# Patient Record
Sex: FEMALE | Race: WHITE | Employment: FULL TIME | URBAN - METROPOLITAN AREA
[De-identification: names, ages, dates, MRNs, and addresses within clinical notes are randomized per-mention and may not be internally consistent; named-entity substitution may affect disease eponyms.]

---

## 2017-05-16 ENCOUNTER — DOCUMENTATION ONLY (OUTPATIENT)
Dept: FAMILY MEDICINE CLINIC | Age: 57
End: 2017-05-16

## 2017-05-16 NOTE — PROGRESS NOTES
HISTORY OF PRESENT ILLNESS  Georgia Brizuela is a 55 y.o. female. HPI    ROS    Physical Exam    ASSESSMENT and PLAN       Message left on cell phone-Last physical was 6/30/16. Patient ask to check with her insurance to be sure that a physical on 5/17/17 will be covered. Ask to reschedule if the insurance will not cover physical on 5/17/17.

## 2017-05-17 ENCOUNTER — OFFICE VISIT (OUTPATIENT)
Dept: FAMILY MEDICINE CLINIC | Age: 57
End: 2017-05-17

## 2017-05-17 VITALS
RESPIRATION RATE: 16 BRPM | DIASTOLIC BLOOD PRESSURE: 78 MMHG | HEART RATE: 88 BPM | TEMPERATURE: 96.7 F | OXYGEN SATURATION: 98 % | BODY MASS INDEX: 27.31 KG/M2 | SYSTOLIC BLOOD PRESSURE: 119 MMHG | HEIGHT: 66 IN | WEIGHT: 169.9 LBS

## 2017-05-17 DIAGNOSIS — Z00.00 PHYSICAL EXAM: Primary | ICD-10-CM

## 2017-05-17 DIAGNOSIS — K59.00 CONSTIPATION, UNSPECIFIED CONSTIPATION TYPE: ICD-10-CM

## 2017-05-17 DIAGNOSIS — E78.00 PURE HYPERCHOLESTEROLEMIA: ICD-10-CM

## 2017-05-17 DIAGNOSIS — K57.30 DIVERTICULOSIS OF LARGE INTESTINE WITHOUT HEMORRHAGE: ICD-10-CM

## 2017-05-17 DIAGNOSIS — R73.09 INCREASED GLUCOSE LEVEL: ICD-10-CM

## 2017-05-17 DIAGNOSIS — Z00.00 LABORATORY EXAM ORDERED AS PART OF ROUTINE GENERAL MEDICAL EXAMINATION: ICD-10-CM

## 2017-05-17 DIAGNOSIS — B00.9 RECURRENT HSV (HERPES SIMPLEX VIRUS): ICD-10-CM

## 2017-05-17 RX ORDER — VALACYCLOVIR HYDROCHLORIDE 1 G/1
TABLET, FILM COATED ORAL
Qty: 10 TAB | Refills: 1 | Status: SHIPPED | OUTPATIENT
Start: 2017-05-17 | End: 2019-11-20

## 2017-05-17 NOTE — PROGRESS NOTES
Chief Complaint   Patient presents with    Complete Physical    Labs     Fasting     1. Have you been to the ER, urgent care clinic since your last visit? Hospitalized since your last visit? No    2. Have you seen or consulted any other health care providers outside of the [address] [place] [address] since your last visit? Include any pap smears or colon screening. No     I have reviewed Health Maintenance with the patient and updated. Advance Care Planning information reviewed and given to the patient. The patient was counseled on the dangers of tobacco use, and Patient is a non smoker. Reviewed strategies to maximize success, including Continue not to smoke. Complete Physical Exam Female  Pre-Visit Questions:    1. Do you follow a low fat or low salt diet ?n   2. Do you follow an exercise program? y  3. Have you had your tetanus booster in the last 10 years? y  3. Have you ever had a Pneumonia vaccine? n  5. Do you smoke? n  6. Do you consider yourself overweight? y  7. Do you perform Breast self exam?y  8. Is there a family history of CAD< age 48? n  5. Is there a family history of Cancer? y  8. Do you have any Cancer risks?n   11. Have you had a colonoscopy? y  15. Have you been to your eye doctor past year?   y  15. Have you been to your dentist in the last 6 months?  y  15. Have you had your flu shot for this season?  y  13. Have you had a Pap smear in the last 3 years?y  16. Have you had your annual mammogram?y  17.   Have you had a bone density scan(DEXA)?y

## 2017-05-17 NOTE — LETTER
5/19/2017 2:09 PM 
 
Ms. Georgia Brizuela 
[id number] [facility] [address] [address] Dear Brianna Archuleta: 
 
Please find your most recent results below. Resulted Orders VITAMIN D, 25 HYDROXY Result Value Ref Range VITAMIN D, 25-HYDROXY 41.5 30.0 - 100.0 ng/mL Comment:  
   Vitamin D deficiency has been defined by the [address] [place] [address] practice guideline as a 
level of serum 25-OH vitamin D less than 20 ng/mL (1,2). The Endocrine Society went on to further define vitamin D 
insufficiency as a level between 21 and 29 ng/mL (2). 1. IOM ([place] of [employer]). 2010. Dietary reference 
   intakes for calcium and D. [facility]: The 
   hive01. 2. Magdy MF, Nasima TERRAZAS, Zaid THORNE, et al. 
   Evaluation, treatment, and prevention of vitamin D 
   deficiency: an Endocrine Society clinical practice 
   guideline. JCEM. 2011 Jul; 96(7):1911-30. Narrative Performed at:  [address]  [id number] : Mehran Marte MD, Phone:  [phone] URINALYSIS W/ RFLX MICROSCOPIC Result Value Ref Range Specific Gravity 1.027 1.005 - 1.030  
 pH (UA) 7.0 5.0 - 7.5 Color Yellow Yellow Appearance Clear Clear Leukocyte Esterase Negative Negative Protein Negative Negative/Trace Glucose Negative Negative Ketone Negative Negative Blood Negative Negative Bilirubin Negative Negative Urobilinogen 0.2 0.2 - 1.0 mg/dL Nitrites Negative Negative Microscopic Examination Comment Comment:  
   Microscopic not indicated and not performed. Narrative Performed at:  [address]  [id number] : Mehran Marte MD, Phone:  [phone] TSH 3RD GENERATION Result Value Ref Range TSH 0.760 0.450 - 4.500 uIU/mL Narrative Performed at:  [address]  [id number] : Yuliya Adame MD, Phone:  [phone] METABOLIC PANEL, COMPREHENSIVE Result Value Ref Range Glucose 100 (H) 65 - 99 mg/dL BUN 14 6 - 24 mg/dL Creatinine 0.88 0.57 - 1.00 mg/dL GFR est non-AA 74 >59 mL/min/1.73 GFR est AA 86 >59 mL/min/1.73  
 BUN/Creatinine ratio 16 9 - 23 Sodium 142 134 - 144 mmol/L Potassium 4.7 3.5 - 5.2 mmol/L Chloride 103 96 - 106 mmol/L  
 CO2 25 18 - 29 mmol/L Calcium 9.5 8.7 - 10.2 mg/dL Protein, total 7.0 6.0 - 8.5 g/dL Albumin 4.5 3.5 - 5.5 g/dL GLOBULIN, TOTAL 2.5 1.5 - 4.5 g/dL A-G Ratio 1.8 1.2 - 2.2 Bilirubin, total 0.4 0.0 - 1.2 mg/dL Alk. phosphatase 67 39 - 117 IU/L  
 AST (SGOT) 21 0 - 40 IU/L  
 ALT (SGPT) 20 0 - 32 IU/L Narrative Performed at:  [address]  [id number] : Yuliya Adame MD, Phone:  [phone] LIPID PANEL Result Value Ref Range Cholesterol, total 270 (H) 100 - 199 mg/dL Triglyceride 137 0 - 149 mg/dL HDL Cholesterol 77 >39 mg/dL VLDL, calculated 27 5 - 40 mg/dL LDL, calculated 166 (H) 0 - 99 mg/dL Narrative Performed at:  [address]  [id number] : Yuliya Adame MD, Phone:  [phone] CBC WITH AUTOMATED DIFF Result Value Ref Range WBC 3.9 3.4 - 10.8 x10E3/uL  
 RBC 4.58 3.77 - 5.28 x10E6/uL HGB 13.6 11.1 - 15.9 g/dL HCT 41.4 34.0 - 46.6 % MCV 90 79 - 97 fL  
 MCH 29.7 26.6 - 33.0 pg  
 MCHC 32.9 31.5 - 35.7 g/dL  
 RDW 13.0 12.3 - 15.4 % PLATELET 419 695 - 341 x10E3/uL NEUTROPHILS 52 % Lymphocytes 34 % MONOCYTES 8 % EOSINOPHILS 5 % BASOPHILS 1 %  
 ABS. NEUTROPHILS 2.1 1.4 - 7.0 x10E3/uL Abs Lymphocytes 1.3 0.7 - 3.1 x10E3/uL  
 ABS. MONOCYTES 0.3 0.1 - 0.9 x10E3/uL  
 ABS. EOSINOPHILS 0.2 0.0 - 0.4 x10E3/uL  
 ABS. BASOPHILS 0.0 0.0 - 0.2 x10E3/uL IMMATURE GRANULOCYTES 0 % ABS. IMM. GRANS. 0.0 0.0 - 0.1 x10E3/uL Narrative Performed at:  [address]  [id number] : Susanna Alvarez MD, Phone:  [phone] CVD REPORT Result Value Ref Range INTERPRETATION Note Comment:  
   Supplement report is available. Narrative Performed at:  [address] [place] [address]  [id number] : Mary Max PhD, Phone:  [phone] Mert Douglas MD

## 2017-05-17 NOTE — PROGRESS NOTES
HISTORY OF PRESENT ILLNESS  Georgia Brizuela is a 55 y.o. female. HPI   Here for [facility]. Notes 10# weight gain. No regular exercise. Not watching diet. Eye doctor past yr. Dentist 2 x annually. Colonoscopy '11.  10 yr repeat. Gyn and mammo past yr. DEXA > 10 yrs ago. Derm annually. Scalp itching and rash. Given cream with benefit. Patient Active Problem List   Diagnosis Code    Estrogen deficiency E28.39    Pure hypercholesterolemia E78.00    Insomnia due to stress F51.02    Stress F43.9    Diverticulosis of colon K57.30    Constipation K59.00    Recurrent HSV (herpes simplex virus) B00.9     Current Outpatient Prescriptions   Medication Sig Dispense Refill    traZODone (DESYREL) 100 mg tablet take 1 to 1 and 1/2 tablets by mouth nightly at bedtime 135 Tab 2    valACYclovir (VALTREX) 1 g tablet TAKE 1/2-1 TABLET BY MOUTH TWICE DAILY FOR 3 DAYS 10 Tab 1    conjugated estrogens (PREMARIN) 0.3 mg tablet Take 0.3 mg by mouth daily.        Allergies   Allergen Reactions    Codeine Nausea and Vomiting    Pcn [Penicillins] Hives    Sulfa (Sulfonamide Antibiotics) Hives     Past Medical History:   Diagnosis Date    Atypical chest pain 1/7/10    Constipation, chronic                chronic issue but 7/10/15 [facility] PF    Contact dermatitis and other eczema, due to unspecified cause     Diverticula of colon     Estrogen deficiency 2009    H/O cold sores     Hypercholesterolemia     Insomnia     Nausea & vomiting     Other pyelonephritis or pyonephrosis, not specified as acute or chronic 1980    Pharyngitis 10/4/15    [facility]    Psychiatric disorder     anxiety    Radius fracture 2013    left wrist @ [facility] then to TOSS     Past Surgical History:   Procedure Laterality Date    HX  SECTION      X1    HX DILATION AND CURETTAGE      X2    HX GYN  06    RICHARD BSO    HX HYSTERECTOMY      HX ORTHOPAEDIC  05    L knee ant cruc repair    HX OTHER SURGICAL exploratory lap    I&D ABCESS COMP/MULTIPLE  5/23/11 [facility] er    SC COLONOSCOPY FLX DX W/COLLJ SPEC WHEN PFRMD  4/1/2011          Family History   Problem Relation Age of Onset    Hypertension Mother     Thyroid Disease Mother     Heart Disease Maternal Grandmother     Diabetes Maternal Grandmother     Hypertension Brother      Social History   Substance Use Topics    Smoking status: Never Smoker    Smokeless tobacco: Never Used    Alcohol use 0.0 oz/week      Comment: 3 drinks/week      Lab Results  Component Value Date/Time   WBC 4.6 06/17/2016 08:31 AM   HGB 13.3 06/17/2016 08:31 AM   HCT 39.4 06/17/2016 08:31 AM   PLATELET 454 13/97/0093 08:31 AM   MCV 89 06/17/2016 08:31 AM       Lab Results  Component Value Date/Time   Glucose 92 06/17/2016 08:31 AM   LDL, calculated 98 06/17/2016 08:31 AM   Creatinine 0.84 06/17/2016 08:31 AM      Lab Results  Component Value Date/Time   Cholesterol, total 213 06/17/2016 08:31 AM   HDL Cholesterol 70 06/17/2016 08:31 AM   LDL, calculated 98 06/17/2016 08:31 AM   Triglyceride 223 06/17/2016 08:31 AM   CHOL/HDL Ratio 3.0 09/03/2009 08:48 AM       Lab Results  Component Value Date/Time   ALT (SGPT) 20 06/17/2016 08:31 AM   AST (SGOT) 28 06/17/2016 08:31 AM   Alk. phosphatase 56 06/17/2016 08:31 AM   Bilirubin, total 0.4 06/17/2016 08:31 AM       Lab Results  Component Value Date/Time   GFR est AA 91 06/17/2016 08:31 AM   GFR est non-AA 79 06/17/2016 08:31 AM   Creatinine 0.84 06/17/2016 08:31 AM   BUN 14 06/17/2016 08:31 AM   Sodium 143 06/17/2016 08:31 AM   Potassium 4.6 06/17/2016 08:31 AM   Chloride 103 06/17/2016 08:31 AM   CO2 28 06/17/2016 08:31 AM      Lab Results  Component Value Date/Time   TSH 1.160 06/17/2016 08:31 AM      Lab Results   Component Value Date/Time    Glucose 92 06/17/2016 08:31 AM       Fasting for labs  Review of Systems   Constitutional: Negative. Eyes: Negative. Respiratory: Negative. Cardiovascular: Negative. Gastrointestinal: Positive for constipation. Genitourinary: Negative. Musculoskeletal: Positive for joint pain. Skin: Positive for itching. Neurological: Positive for headaches. Endo/Heme/Allergies: Positive for environmental allergies. Psychiatric/Behavioral: Negative. Physical Exam   Vitals:    05/17/17 0920   BP: 119/78   Pulse: 88   Resp: 16   Temp: 96.7 °F (35.9 °C)   TempSrc: Oral   SpO2: 98%   Weight: 169 lb 14.4 oz (77.1 kg)   Height: 5' 5.5\" (1.664 m)       General    alert, cooperative, no distress, appears stated age   Head    Normocephalic, without obvious abnormality, atraumatic   Eyes    conjunctivae/corneas clear. PERRL. Fundi benign   Ears    Canals and TMs clear   Nose Passages patent. Mucosa normal. No drainage or sinus tenderness. Throat Lips, mucosa, and tongue normal. Teeth and gums normal.  Post pharynx neg. Neck supple, nontender, no adenopathy, thyroid: not enlarged, no masses/nodules, no carotid bruits   Back     symmetric, no curvature. FROM. No CVA tenderness   Lungs     clear to auscultation bilaterally   Breasts    Declined   Heart    Regular rate and rhythm, with no murmur, click, rub or gallop   Abdomen   Soft, non-tender. Bowel sounds normal. No masses,  No organomegaly   Genitalia and Rectal  Deferred. Per GYN. Extremities   extremities normal, atraumatic, no cyanosis or edema. Left bunion, NT. Tender nodules of bilat plantar fascia. Pulses   1-2+ and symmetric   Skin No rashes or lesions       Neurologic Romberg neg, nml heel, toe and Tandem gait. DTRs 2+ symmetric         ASSESSMENT and PLAN    ICD-10-CM ICD-9-CM    1. Physical exam Z00.00 V70.9 VITAMIN D, 25 HYDROXY      URINALYSIS W/ RFLX MICROSCOPIC      TSH 3RD GENERATION      METABOLIC PANEL, COMPREHENSIVE      LIPID PANEL      CBC WITH AUTOMATED DIFF   2. Recurrent HSV (herpes simplex virus) B00.9 054.9 valACYclovir (VALTREX) 1 gram tablet   3.  Diverticulosis of large intestine without hemorrhage K57.30 562.10    4. Constipation, unspecified constipation type K59.00 564.00    5. Pure hypercholesterolemia E78.00 272.0 LIPID PANEL   6. Laboratory exam ordered as part of routine general medical examination Z00.00 V72.62 VITAMIN D, 25 HYDROXY      URINALYSIS W/ RFLX MICROSCOPIC      TSH 3RD GENERATION      METABOLIC PANEL, COMPREHENSIVE      LIPID PANEL      CBC WITH AUTOMATED DIFF     Follow-up Disposition:  Return in about 1 year (around 5/17/2018) for annual [facility] and labs.

## 2017-05-17 NOTE — MR AVS SNAPSHOT
Visit Information Date & Time Provider Department Dept. Phone Encounter #  
 5/17/2017  9:00 AM Deborah Castro MD [facility] [phone] [id number] Follow-up Instructions Return in about 1 year (around 5/17/2018) for annual [facility] and labs. Upcoming Health Maintenance Date Due  
 BREAST CANCER SCRN MAMMOGRAM 8/15/2017* PAP AKA CERVICAL CYTOLOGY 8/15/2017* INFLUENZA AGE 9 TO ADULT 8/1/2017 COLONOSCOPY 4/1/2021 DTaP/Tdap/Td series (2 - Td) 5/9/2023 *Topic was postponed. The date shown is not the original due date. Allergies as of 5/17/2017  Review Complete On: 5/17/2017 By: Deborah Castro MD  
  
 Severity Noted Reaction Type Reactions Codeine  08/07/2009    Nausea and Vomiting Pcn [Penicillins]  08/07/2009    Hives Sulfa (Sulfonamide Antibiotics)  08/07/2009    Hives Current Immunizations  Reviewed on 8/16/2013 Name Date  
 TD Vaccine 5/12/2004 Tdap 5/9/2013 Not reviewed this visit You Were Diagnosed With   
  
 Codes Comments Recurrent HSV (herpes simplex virus)     ICD-10-CM: B00.9 ICD-9-CM: 054.9 Vitals BP Pulse Temp Resp Height(growth percentile) Weight(growth percentile) 119/78 (BP 1 Location: Left arm, BP Patient Position: Sitting) 88 96.7 °F (35.9 °C) (Oral) 16 5' 5.5\" (1.664 m) 169 lb 14.4 oz (77.1 kg) SpO2 BMI OB Status Smoking Status 98% 27.84 kg/m2 Hysterectomy Never Smoker Vitals History BMI and BSA Data Body Mass Index Body Surface Area  
 27.84 kg/m 2 1.89 m 2 Preferred Pharmacy Pharmacy Name Phone [facility], [address] [phone] Your Updated Medication List  
  
   
This list is accurate as of: 5/17/17 10:08 AM.  Always use your most recent med list.  
  
  
  
  
 PREMARIN 0.3 mg tablet Generic drug:  conjugated estrogens Take 0.3 mg by mouth daily. traZODone 100 mg tablet Commonly known as:  DESYREL  
take 1 to 1 and 1/2 tablets by mouth nightly at bedtime  
  
 valACYclovir 1 gram tablet Commonly known as:  VALTREX  
TAKE 1/2-1 TABLET BY MOUTH TWICE DAILY FOR 3 DAYS Follow-up Instructions Return in about 1 year (around 5/17/2018) for annual [facility] and labs. Introducing [facility]! [facility] introduces [employer] patient portal. Now you can access parts of your medical record, email your doctor's office, and request medication refills online. 1. In your internet browser, go to [email or web address] 2. Click on the First Time User? Click Here link in the Sign In box. You will see the New Member Sign Up page. 3. Enter your [employer] Access Code exactly as it appears below. You will not need to use this code after youve completed the sign-up process. If you do not sign up before the expiration date, you must request a new code. · [employer] Access Code: [email or web address] Expires: 8/15/2017 10:08 AM 
 
4. Enter the last four digits of your Social Security Number (xxxx) and Date of Birth (mm/dd/yyyy) as indicated and click Submit. You will be taken to the next sign-up page. 5. Create a [employer] ID. This will be your [employer] login ID and cannot be changed, so think of one that is secure and easy to remember. 6. Create a [employer] password. You can change your password at any time. 7. Enter your Password Reset Question and Answer. This can be used at a later time if you forget your password. 8. Enter your e-mail address. You will receive e-mail notification when new information is available in [address] 9. Click Sign Up. You can now view and download portions of your medical record. 10. Click the Download Summary menu link to download a portable copy of your medical information. If you have questions, please visit the Frequently Asked Questions section of the [employer] website.  Remember, [employer] is NOT to be used for urgent needs. For medical emergencies, dial 911. Now available from your iPhone and Android! Please provide this summary of care documentation to your next provider. Your primary care clinician is listed as Maxine Brody Dr. If you have any questions after today's visit, please call [phone].

## 2017-05-18 LAB
25(OH)D3+25(OH)D2 SERPL-MCNC: 41.5 NG/ML (ref 30–100)
ALBUMIN SERPL-MCNC: 4.5 G/DL (ref 3.5–5.5)
ALBUMIN/GLOB SERPL: 1.8 {RATIO} (ref 1.2–2.2)
ALP SERPL-CCNC: 67 IU/L (ref 39–117)
ALT SERPL-CCNC: 20 IU/L (ref 0–32)
APPEARANCE UR: CLEAR
AST SERPL-CCNC: 21 IU/L (ref 0–40)
BASOPHILS # BLD AUTO: 0 X10E3/UL (ref 0–0.2)
BASOPHILS NFR BLD AUTO: 1 %
BILIRUB SERPL-MCNC: 0.4 MG/DL (ref 0–1.2)
BILIRUB UR QL STRIP: NEGATIVE
BUN SERPL-MCNC: 14 MG/DL (ref 6–24)
BUN/CREAT SERPL: 16 (ref 9–23)
CALCIUM SERPL-MCNC: 9.5 MG/DL (ref 8.7–10.2)
CHLORIDE SERPL-SCNC: 103 MMOL/L (ref 96–106)
CHOLEST SERPL-MCNC: 270 MG/DL (ref 100–199)
CO2 SERPL-SCNC: 25 MMOL/L (ref 18–29)
COLOR UR: YELLOW
CREAT SERPL-MCNC: 0.88 MG/DL (ref 0.57–1)
EOSINOPHIL # BLD AUTO: 0.2 X10E3/UL (ref 0–0.4)
EOSINOPHIL NFR BLD AUTO: 5 %
ERYTHROCYTE [DISTWIDTH] IN BLOOD BY AUTOMATED COUNT: 13 % (ref 12.3–15.4)
GLOBULIN SER CALC-MCNC: 2.5 G/DL (ref 1.5–4.5)
GLUCOSE SERPL-MCNC: 100 MG/DL (ref 65–99)
GLUCOSE UR QL: NEGATIVE
HCT VFR BLD AUTO: 41.4 % (ref 34–46.6)
HDLC SERPL-MCNC: 77 MG/DL
HGB BLD-MCNC: 13.6 G/DL (ref 11.1–15.9)
HGB UR QL STRIP: NEGATIVE
IMM GRANULOCYTES # BLD: 0 X10E3/UL (ref 0–0.1)
IMM GRANULOCYTES NFR BLD: 0 %
INTERPRETATION, 910389: NORMAL
KETONES UR QL STRIP: NEGATIVE
LDLC SERPL CALC-MCNC: 166 MG/DL (ref 0–99)
LEUKOCYTE ESTERASE UR QL STRIP: NEGATIVE
LYMPHOCYTES # BLD AUTO: 1.3 X10E3/UL (ref 0.7–3.1)
LYMPHOCYTES NFR BLD AUTO: 34 %
MCH RBC QN AUTO: 29.7 PG (ref 26.6–33)
MCHC RBC AUTO-ENTMCNC: 32.9 G/DL (ref 31.5–35.7)
MCV RBC AUTO: 90 FL (ref 79–97)
MICRO URNS: NORMAL
MONOCYTES # BLD AUTO: 0.3 X10E3/UL (ref 0.1–0.9)
MONOCYTES NFR BLD AUTO: 8 %
NEUTROPHILS # BLD AUTO: 2.1 X10E3/UL (ref 1.4–7)
NEUTROPHILS NFR BLD AUTO: 52 %
NITRITE UR QL STRIP: NEGATIVE
PH UR STRIP: 7 [PH] (ref 5–7.5)
PLATELET # BLD AUTO: 204 X10E3/UL (ref 150–379)
POTASSIUM SERPL-SCNC: 4.7 MMOL/L (ref 3.5–5.2)
PROT SERPL-MCNC: 7 G/DL (ref 6–8.5)
PROT UR QL STRIP: NEGATIVE
RBC # BLD AUTO: 4.58 X10E6/UL (ref 3.77–5.28)
SODIUM SERPL-SCNC: 142 MMOL/L (ref 134–144)
SP GR UR: 1.03 (ref 1–1.03)
TRIGL SERPL-MCNC: 137 MG/DL (ref 0–149)
TSH SERPL DL<=0.005 MIU/L-ACNC: 0.76 UIU/ML (ref 0.45–4.5)
UROBILINOGEN UR STRIP-MCNC: 0.2 MG/DL (ref 0.2–1)
VLDLC SERPL CALC-MCNC: 27 MG/DL (ref 5–40)
WBC # BLD AUTO: 3.9 X10E3/UL (ref 3.4–10.8)

## 2017-05-18 NOTE — PROGRESS NOTES
Inc BS. Add A1c. Lipids worse. Rec get heart scan to assess CAD risk and begin statin tx based upon results.

## 2017-05-19 DIAGNOSIS — E78.5 HYPERLIPIDEMIA, UNSPECIFIED HYPERLIPIDEMIA TYPE: Primary | ICD-10-CM

## 2017-05-19 LAB
HBA1C MFR BLD: 5.9 % (ref 4.8–5.6)
SPECIMEN STATUS REPORT, ROLRST: NORMAL

## 2017-05-19 NOTE — PROGRESS NOTES
Spoke to name ID'd patient and copy sent. She will do the heart CT so order done.   A1c also discussed

## 2017-05-23 ENCOUNTER — HOSPITAL ENCOUNTER (OUTPATIENT)
Dept: CT IMAGING | Age: 57
Discharge: HOME | End: 2017-05-23
Payer: COMMERCIAL

## 2017-05-23 DIAGNOSIS — E78.5 HYPERLIPIDEMIA, UNSPECIFIED HYPERLIPIDEMIA TYPE: ICD-10-CM

## 2017-05-23 PROCEDURE — 75571 CT HRT W/O DYE W/CA TEST: CPT

## 2017-05-23 NOTE — LETTER
2017 5:14 PM 
 
Ms. Georgia Brizuela 
[address] [facility] [address] [address] Dear Brandon Bryant: 
 
Please find your most recent results below. Resulted Orders CT HEART W/O CONT WITH CALCIUM Narrative Coronary Artery CT Quantitative Calcium Scoring. Technique: Unenhanced multislice helical limited chest CT. CALCIUM SCORE 
0-0 = No evidence of CAD 
1-10 = Minimal evidence of CAD 
 = Mild evidence of CAD 
101-400 = Moderate evidence of CAD 
>400 = Extensive evidence of CAD Left main: 0 Left anterior descendin Left circumflex: 0 Right coronary: 0 Posterior descendin Total calcium score: 0 A score of 0 implies a low likelihood of coronary obstruction, but cannot 
totally exclude the presence of atherosclerosis. [place] Bending Chest CT findings:  The portion of the lungs and mediastinum included in the 
coronary artery imaging field are normal with no mass, nodule, airspace disease 
or edema noted. The lungs were evaluated from approximately the level of the 
regulo to the lung bases. Impression IMPRESSION: Total calcium score of 0. A low score suggests a low likelihood of 
coronary disease but does not exclude the possibility of significant coronary 
artery narrowing. The result should be discussed with your physician taking into 
account other risk factors such as age, gender, family history, diabetes, 
smoking or high cholesterol levels.  
  
 
 
RECOMMENDATIONS: 
 
 
 
Sincerely, 
 
 
[facility] CT 1

## 2017-05-25 NOTE — PROGRESS NOTES
Discussed and she wants to work on diet and recheck cholesterol in 4 months. She had just gotten back from [place] prior to having the lab done and she wasn't on her regular healthy diet while on vacation. She would like to avoid cholesterol meds if at all possible.

## 2017-06-16 ENCOUNTER — HOSPITAL ENCOUNTER (EMERGENCY)
Age: 57
Discharge: HOME | End: 2017-06-16
Attending: FAMILY MEDICINE

## 2017-06-16 DIAGNOSIS — R21 RASH: Primary | ICD-10-CM

## 2017-06-16 RX ORDER — PERMETHRIN 50 MG/G
CREAM TOPICAL
Qty: 60 G | Refills: 0 | Status: SHIPPED | OUTPATIENT
Start: 2017-06-16 | End: 2018-03-26

## 2017-06-16 RX ORDER — PREDNISONE 10 MG/1
TABLET ORAL
Qty: 21 TAB | Refills: 0 | Status: SHIPPED | OUTPATIENT
Start: 2017-06-16 | End: 2018-03-26 | Stop reason: ALTCHOICE

## 2017-06-16 NOTE — DISCHARGE INSTRUCTIONS

## 2017-06-16 NOTE — UC PROVIDER NOTE
Patient is a 55 y.o. female presenting with rash. The history is provided by the patient. Rash    Episode onset: 2 weeks ago - generalized; moves from one area to another. The problem has been gradually worsening. The problem is associated with an unknown factor. There has been no fever. The rash is present on the trunk, chest, back and scalp. The patient is experiencing no pain. Associated symptoms include itching. She has tried oral antihistamines for the symptoms. The treatment provided no relief. Past Medical History:   Diagnosis Date    Atypical chest pain 1/7/10    Constipation, chronic                chronic issue but 7/10/15 [facility]    Contact dermatitis and other eczema, due to unspecified cause     Diverticula of colon     Estrogen deficiency 2009    H/O cold sores     Hypercholesterolemia     Insomnia     Nausea & vomiting     Other pyelonephritis or pyonephrosis, not specified as acute or chronic 1980    Pharyngitis 10/4/15    [facility]    Psychiatric disorder     anxiety    Radius fracture 2013    left wrist @ [facility] then to TOSS        Past Surgical History:   Procedure Laterality Date    HX  SECTION      X1    HX DILATION AND CURETTAGE      X2    HX GYN  06    RICHARD BSO    HX HYSTERECTOMY      HX ORTHOPAEDIC  05    L knee ant cruc repair    HX OTHER SURGICAL      exploratory lap    [facility]    MO COLONOSCOPY FLX DX W/COLLJ SPEC WHEN PFRMD  2011              Family History   Problem Relation Age of Onset    Hypertension Mother     Thyroid Disease Mother     Heart Disease Maternal Grandmother     Diabetes Maternal Grandmother     Hypertension Brother         Social History     Social History    Marital status:      Spouse name: N/A    Number of children: N/A    Years of education: N/A     Occupational History    Not on file.      Social History Main Topics    Smoking status: Never Smoker    Smokeless tobacco: Never Used    Alcohol use 0.0 oz/week      Comment: 3 drinks/week    Drug use: No    Sexual activity: Yes     Partners: Male     Other Topics Concern    Not on file     Social History Narrative                ALLERGIES: Codeine; Pcn [penicillins]; and Sulfa (sulfonamide antibiotics)    Review of Systems   Constitutional: Negative for chills and fever. Respiratory: Negative for shortness of breath and wheezing. Cardiovascular: Negative for chest pain and palpitations. Gastrointestinal: Negative for nausea and vomiting. Musculoskeletal: Negative for myalgias. Skin: Positive for itching and rash. Neurological: Negative for dizziness and headaches. Hematological: Negative for adenopathy. There were no vitals filed for this visit. Physical Exam   Constitutional: She appears well-developed and well-nourished. No distress. Neurological: She is alert. Skin: She is not diaphoretic. Scattered erythematous papular lesions on right anterior shoulder, and scalp; bilateral wrists   Psychiatric: She has a normal mood and affect. Her behavior is normal. Judgment and thought content normal.   Nursing note and vitals reviewed. MDM     Differential Diagnosis; Clinical Impression; Plan:     CLINICAL IMPRESSION:  Rash  (primary encounter diagnosis)    Plan:  1. Permethrin cream  2. Prednisone  3. PCP if no improvement  Risk of Significant Complications, Morbidity, and/or Mortality:   Presenting problems: Moderate  Management options:   Moderate  Progress:   Patient progress:  Stable      Procedures

## 2017-12-20 DIAGNOSIS — F51.02 INSOMNIA DUE TO STRESS: ICD-10-CM

## 2017-12-21 RX ORDER — TRAZODONE HYDROCHLORIDE 100 MG/1
TABLET ORAL
Qty: 135 TAB | Refills: 1 | Status: SHIPPED | OUTPATIENT
Start: 2017-12-21 | End: 2018-06-10 | Stop reason: SDUPTHER

## 2017-12-21 NOTE — TELEPHONE ENCOUNTER
Requested Prescriptions     Pending Prescriptions Disp Refills    traZODone (DESYREL) 100 mg tablet [Pharmacy Med Name: TRAZODONE 100 MG TABLET] 135 Tab 1     Sig: TAKE 1 TO 1 AND 1/2 TABLETS BY MOUTH NIGHTLY AT BEDTIME     Last Refill: 12/06/2016    Last Office Visit: 05/17/2017    Upcoming Appointment: None    Last Labs: 05/17/2017

## 2018-03-26 ENCOUNTER — OFFICE VISIT (OUTPATIENT)
Dept: FAMILY MEDICINE CLINIC | Age: 58
End: 2018-03-26

## 2018-03-26 VITALS
SYSTOLIC BLOOD PRESSURE: 117 MMHG | BODY MASS INDEX: 25.44 KG/M2 | WEIGHT: 158.3 LBS | TEMPERATURE: 98.4 F | RESPIRATION RATE: 20 BRPM | HEART RATE: 85 BPM | HEIGHT: 66 IN | DIASTOLIC BLOOD PRESSURE: 79 MMHG

## 2018-03-26 DIAGNOSIS — H65.111 ACUTE MUCOID OTITIS MEDIA OF RIGHT EAR: ICD-10-CM

## 2018-03-26 DIAGNOSIS — J02.9 SORE THROAT: Primary | ICD-10-CM

## 2018-03-26 LAB
S PYO AG THROAT QL: NEGATIVE
VALID INTERNAL CONTROL?: YES

## 2018-03-26 RX ORDER — AZITHROMYCIN 250 MG/1
TABLET, FILM COATED ORAL
Qty: 6 TAB | Refills: 0 | Status: SHIPPED | OUTPATIENT
Start: 2018-03-26 | End: 2018-04-02 | Stop reason: SDUPTHER

## 2018-03-26 NOTE — PATIENT INSTRUCTIONS
Please take the azithromycin as directed. This is an antibiotic and you should take all of it as directed until it is complete, even if you are feeling better. This prevents bacterial resistance. Please throw away your toothbrush (or put it through  cycle) after 24 hours of starting the antibiotics. You should no longer be contagious after taking antibiotics for 24 hours. Eating healthy, drinking enough fluids (especially water) and getting enough sleep (8hrs) are also important to help you heal.     Please use good handwashing technique and make sure you wash hands before and after eating. Use hand  if you are in a public place. An upper respiratory infection (URI) is an infection of the throat and nose. It is also known as \"the common cold\". 90% of these infections are caused by a virus. Viral infections do not respond to antibiotic treatment, only bacteria are killed by antibiotics. Therefore, supportive treatment is recommended to help with symptoms. Symptoms usually subside after 7-10 days (or longer if you smoke). If symptoms worsen or persist after 14 days, or if you have fever over 101.3, fever for 5 days or more, wheezing, or shortness of breath, please contact the office. To prevent URIs, wash hands frequently (epecially before and after eating - use hand  if you are in a public place.) Eat a healthy diet, get regular exercise and sufficient sleep. Reduce your exposure to cigarette smoke. If you need to cough or sneeze, use the crook of your arm to cover your mouth. Supportive Care    1) Alternate 400mg Ibuprofen and 650mg Tylenol every 4 hours as needed for sinus pain and discomfort. Make sure to take Ibuprofen with food as it can cause stomach upset. Do not exceed 3000 mg Tylenol per day. 2) Take a daily antihistamine to reduce symptoms such as sneezing, runny nose and itchy eyes.  You can buy these over the counter (OTC) (no prescription needed) such as Claritin, Allegra or Zyrtec. Take this daily as it takes 3-4 weeks for the full therapeutic effect to take place. Follow directions on package. If symptoms of sneezing, coughing and runny nose are severe, you can try taking Benadryl at night before bed. However, Benadryl can cause drowsiness, so please use caution. Elderly people should not use Benadryl due to side effects and increase risk of falling. 3) OTC Robitussin or Delsym for cough relief. Follow directions on package. Do not exceed maximum dose. May cause drowsiness. If you have high blood pressure or kidney problems, avoid cough medicines that contain decongestants -- such as pseudoephedrine, ephedrine, phenylephrine, naphazoline and oxymetazoline. 4) Use an OTC decongestant nasal spray such as Flonase, Nasonex, or Rhinocort. These contain a steroid and will help reduce congestion. You can spray 2x in each nostril two times a day for 3-5 days. Use the opposite hand of the nostril you are spraying and look down at your toes when you administer the nasal spray. This ensures the spray is applied to the nasal tissue properly. If you use Afrin for nasal congestion, DO NOT use for more than 5 days (due to rebound congestion)     Saline nasal spray can be used daily and will help restore moisture to dry nasal passages, wash away allergens and can help prevent inflammation of the mucous membranes. 5) Mucinex (guaifenesin) helps thin mucus and may make it easier to clear it from head, throat and lungs. 6) Increase your fluid consumption, especially water. Eat a well-balanced diet and avoid dairy and sugar as these can increase or thicken congestion. 7) Warm salt water gargle can help alleviate sore throat (1 teaspoon in 8 oz warm water)    8) Honey is a natural cough suppressor - can take a teaspoon of honey for cough or mix with hot tea. Local honey can help inoculate against local pollen that causes allergic reactions.   (It has to be local honey from our area. You can usually find local honey at farmers' markets.)     9) Humidify air, especially in bedroom at night, as it may help with nasal and throat dryness. Breathing in steam from a shower may help loosen mucus and soothe an irritated throat. 10) Get plenty of rest!    11) A warm soak in a bath can help ease muscle and body aches. Add 1 cup of epsom salt to water (Dr. Ervin Diaz is a good one- at [facility] for around $6).    12) Emergen C or Airbourne - vitamin supplements containing high doses of vitamin C, Vitamin B12 and B6 that can is marketed to help prevent or stop colds (although this has not been confirmed by scientific research)     If symptoms persist for more than 10 days or if you have a fever above 102, please call the office. Complications of URI include an infection of the skin around the eye and other infections. Watch for signs of fever, chills, body aches or any areas of red, warm, swollen and tender skin. Call immediately if you notice these signs.

## 2018-03-26 NOTE — PROGRESS NOTES
Chief Complaint   Patient presents with    Sore Throat    Cold Symptoms     coughing up phelgm        [place] DAVIS Gelacio  Identified pt with two pt identifiers(name and ). Chief Complaint   Patient presents with    Sore Throat    Cold Symptoms     coughing up phelgm       1. Have you been to the ER, urgent care clinic since your last visit?no    Hospitalized since your last visit? NO    2. Have you seen or consulted any other health care providers outside of the [address] [place] [address] since your last visit? Include any pap smears or colon screening. NO    Today's provider has been notified of reason for visit, vitals and flowsheets obtained on patients.      Patient received paperwork for advance directive during previous visit but has not completed at this time     Reviewed record In preparation for visit, huddled with provider and have obtained necessary documentation      Health Maintenance Due   Topic    PAP AKA CERVICAL CYTOLOGY     BREAST CANCER SCRN MAMMOGRAM     Influenza Age 5 to Adult        Wt Readings from Last 3 Encounters:   18 158 lb 4.8 oz (71.8 kg)   17 169 lb 14.4 oz (77.1 kg)   16 168 lb 0.3 oz (76.2 kg)     Temp Readings from Last 3 Encounters:   18 98.4 °F (36.9 °C) (Oral)   17 96.7 °F (35.9 °C) (Oral)   16 97.7 °F (36.5 °C)     BP Readings from Last 3 Encounters:   18 117/79   17 119/78   16 112/69     Pulse Readings from Last 3 Encounters:   18 85   17 88   16 82     Vitals:    18 1519   BP: 117/79   Pulse: 85   Resp: 20   Temp: 98.4 °F (36.9 °C)   TempSrc: Oral   Weight: 158 lb 4.8 oz (71.8 kg)   Height: 5' 5.5\" (1.664 m)   PainSc:   0 - No pain         Learning Assessment:  :     Learning Assessment 2015   PRIMARY LEARNER Patient   PRIMARY LANGUAGE ENGLISH   LEARNER PREFERENCE PRIMARY READING     LISTENING     DEMONSTRATION   ANSWERED BY patient   RELATIONSHIP SELF       Depression Screening:  : No flowsheet data found. Fall Risk Assessment:  :     No flowsheet data found. Abuse Screening:  :     No flowsheet data found. ADL Screening:  :     ADL Assessment 3/26/2018   Feeding yourself No Help Needed   Getting from bed to chair No Help Needed   Getting dressed No Help Needed   Bathing or showering No Help Needed   Walk across the room (includes cane/walker) No Help Needed   Using the telphone No Help Needed   Taking your medications No Help Needed   Preparing meals No Help Needed   Managing money (expenses/bills) No Help Needed   Moderately strenuous housework (laundry) No Help Needed   Shopping for personal items (toiletries/medicines) No Help Needed   Shopping for groceries No Help Needed   Driving No Help Needed   Climbing a flight of stairs No Help Needed   Getting to places beyond walking distances No Help Needed                 Medication reconciliation up to date and corrected with patient at this time.

## 2018-03-26 NOTE — PROGRESS NOTES
S: Lois Peralta is a 65 y.o. female who presents with runny nose, cough    Assessment/Plan:  1. Sore throat  -significant other has strep (tested + at [facility] today)  -strep = neg  -instructions for supportive care given    2.  Acute mucoid otitis media of right ear  -bilateral OM  - rx: azithromycin (allergy to Pcn)       HPI:    Sx started: 1 week ago  +cough   No productive cough  No SOB or wheezing  No fever/chills  +sore throat  +nasal discharge  No watery eyes    +HA  No sinus pressure  +ear pain/pressure    Social history:   Nutrition: appetite ok, drinking ok - 2-3 coffee, water, iced tea   Occupation: [employer] - works in [employer] w/ a lot of people  Tobacco: none    Review of Systems:  - Constitutional symptoms: + fatigue  - Cardiovascular: no chest pain or palpitations  - Gastrointestinal: no nausea or vomiting or ab pain    I reviewed the following:  Past Medical History:   Diagnosis Date    Atypical chest pain 1/7/10    Constipation, chronic     chronic issue but 7/10/15 [facility]  7/25/17 f/u note from [facility] dermatitis and other eczema, due to unspecified cause     Diverticula of colon     Estrogen deficiency 8/7/2009    H/O cold sores     Hypercholesterolemia     Insomnia     Nausea & vomiting     Other pyelonephritis or pyonephrosis, not specified as acute or chronic 1980    Pharyngitis 10/4/15    [facility]    Psychiatric disorder     anxiety    Radius fracture 11/2013    left wrist @ [facility] then to TOSS        Current Outpatient Prescriptions   Medication Sig Dispense Refill    traZODone (DESYREL) 100 mg tablet TAKE 1 TO 1 AND 1/2 TABLETS BY MOUTH NIGHTLY AT BEDTIME 135 Tab 1    valACYclovir (VALTREX) 1 gram tablet TAKE 1/2-1 TABLET BY MOUTH TWICE DAILY FOR 3 DAYS 10 Tab 1        Allergies   Allergen Reactions    Codeine Nausea and Vomiting    Pcn [Penicillins] Hives    Sulfa (Sulfonamide Antibiotics) Hives       O: VS:   Visit Vitals    /79 (BP 1 Location: Left arm, BP Patient Position: Sitting)    Pulse 85    Temp 98.4 °F (36.9 °C) (Oral)    Resp 20    Ht 5' 5.5\" (1.664 m)    Wt 158 lb 4.8 oz (71.8 kg)    BMI 25.94 kg/m2       Data Reviewed:  Rapid Strep: neg    GENERAL: Georgia Brizuela is in no acute distress. Non-toxic. HEAD:  Normocephalic. Atraumatic. Non tender sinuses x 4. EYE: PERRLA. EOMs intact. Sclera anicteric without injection. No drainage or discharge. EARS: Hearing intact bilaterally. External ear canals normal without evidence of blood or swelling. Bilateral TM's intact, Left: pearly grey with landmarks visible. No erythema, mild serous effusion. Right TM: mucoid effusion noted  NOSE: Patent. Nasal turbinates pink. No polyps noted. No erythema. No discharge. MOUTH: mucous membranes pink and moist. Posterior pharynx injected with cobblestone appearance. No erythema, white exudate or obstruction. Tonsils 2+  NECK: supple. Midline trachea. No anterior cervical lymphadenopathy noted. RESP: Breath sounds are symmetrical bilaterally. Unlabored without SOB. Speaking in full sentences. Clear to auscultation bilaterally anteriorly and posteriorly. No wheezes. No rales or rhonchi. CV: normal rate. Regular rhythm. S1, S2 audible. No murmur noted. No rubs, clicks or gallops noted. ABDOMEN: Soft and nondistended. No tenderness on palpation. SKIN: Skin is warm and dry. Turgor is normal.   ______________________________________________________________________  Patient education was done. Advised on nutrition, tobacco, and good handwashing. Counseling included discussion of diagnosis, differentials, treatment options, prescribed treatment, warning signs and follow up. Medication risks/benefits, costs, interactions and alternatives discussed with patient.      Patient verbalized understanding and agreed to plan of care.  If you are not feeling better or you begin to feel worse or develop new symptoms in 3-4 days please call.    Follow up in 1-2 weeks if symptoms persist or progress.

## 2018-03-26 NOTE — MR AVS SNAPSHOT
[facility] 
 
 
 [address] [facility] [address] 
[address] [place] [address] 
[phone] Patient: Jeanine Godoy MRN: [id number] ADS:[id number]/42/[id number] Visit Information Date & Time Provider Department Dept. Phone Encounter #  
 3/26/2018  3:20 PM Erna Pardo NP [facility] [phone] [id number] Follow-up Instructions Return if symptoms worsen or fail to improve. Upcoming Health Maintenance Date Due  
 PAP AKA CERVICAL CYTOLOGY 7/30/2016 BREAST CANCER SCRN MAMMOGRAM 7/15/2017 Influenza Age 5 to Adult 8/1/2017 COLONOSCOPY 4/1/2021 DTaP/Tdap/Td series (2 - Td) 5/9/2023 Allergies as of 3/26/2018  Review Complete On: 3/26/2018 By: Erna Pardo NP Severity Noted Reaction Type Reactions Codeine  08/07/2009    Nausea and Vomiting Pcn [Penicillins]  08/07/2009    Hives Sulfa (Sulfonamide Antibiotics)  08/07/2009    Hives Current Immunizations  Reviewed on 8/16/2013 Name Date  
 TD Vaccine 5/12/2004 Tdap 5/9/2013 Not reviewed this visit You Were Diagnosed With   
  
 Codes Comments Sore throat    -  Primary ICD-10-CM: J02.9 ICD-9-CM: 836 Acute mucoid otitis media of right ear     ICD-10-CM: H65.111 ICD-9-CM: 381.02 Vitals BP Pulse Temp Resp Height(growth percentile) Weight(growth percentile) 117/79 (BP 1 Location: Left arm, BP Patient Position: Sitting) 85 98.4 °F (36.9 °C) (Oral) 20 5' 5.5\" (1.664 m) 158 lb 4.8 oz (71.8 kg) BMI OB Status Smoking Status 25.94 kg/m2 Hysterectomy Never Smoker BMI and BSA Data Body Mass Index Body Surface Area  
 25.94 kg/m 2 1.82 m 2 Preferred Pharmacy Pharmacy Name Phone [facility] [address] [phone] Your Updated Medication List  
  
   
This list is accurate as of 3/26/18  3:44 PM.  Always use your most recent med list.  
  
  
  
  
 azithromycin 250 mg tablet Commonly known as:  Bob Roach Take 2 tablets today, then take 1 tablet daily  
  
 traZODone 100 mg tablet Commonly known as:  DESYREL  
TAKE 1 TO 1 AND 1/2 TABLETS BY MOUTH NIGHTLY AT BEDTIME  
  
 valACYclovir 1 gram tablet Commonly known as:  VALTREX  
TAKE 1/2-1 TABLET BY MOUTH TWICE DAILY FOR 3 DAYS Prescriptions Sent to Pharmacy Refills  
 azithromycin (ZITHROMAX) 250 mg tablet 0 Sig: Take 2 tablets today, then take 1 tablet daily Class: Normal  
 Pharmacy: [address], [address] [place] [address] #: [phone] We Performed the Following AMB POC RAPID STREP A [47561 CPT(R)] Follow-up Instructions Return if symptoms worsen or fail to improve. Patient Instructions Please take the azithromycin as directed. This is an antibiotic and you should take all of it as directed until it is complete, even if you are feeling better. This prevents bacterial resistance. Please throw away your toothbrush (or put it through  cycle) after 24 hours of starting the antibiotics. You should no longer be contagious after taking antibiotics for 24 hours. Eating healthy, drinking enough fluids (especially water) and getting enough sleep (8hrs) are also important to help you heal.  
 
Please use good handwashing technique and make sure you wash hands before and after eating. Use hand  if you are in a public place. An upper respiratory infection (URI) is an infection of the throat and nose. It is also known as \"the common cold\". 90% of these infections are caused by a virus. Viral infections do not respond to antibiotic treatment, only bacteria are killed by antibiotics. Therefore, supportive treatment is recommended to help with symptoms. Symptoms usually subside after 7-10 days (or longer if you smoke).   If symptoms worsen or persist after 14 days, or if you have fever over 101.3, fever for 5 days or more, wheezing, or shortness of breath, please contact the office. To prevent URIs, wash hands frequently (epecially before and after eating - use hand  if you are in a public place.) Eat a healthy diet, get regular exercise and sufficient sleep. Reduce your exposure to cigarette smoke. If you need to cough or sneeze, use the crook of your arm to cover your mouth. Supportive Care 1) Alternate 400mg Ibuprofen and 650mg Tylenol every 4 hours as needed for sinus pain and discomfort. Make sure to take Ibuprofen with food as it can cause stomach upset. Do not exceed 3000 mg Tylenol per day. 2) Take a daily antihistamine to reduce symptoms such as sneezing, runny nose and itchy eyes. You can buy these over the counter (OTC) (no prescription needed) such as Claritin, Allegra or Zyrtec. Take this daily as it takes 3-4 weeks for the full therapeutic effect to take place. Follow directions on package. If symptoms of sneezing, coughing and runny nose are severe, you can try taking Benadryl at night before bed. However, Benadryl can cause drowsiness, so please use caution. Elderly people should not use Benadryl due to side effects and increase risk of falling. 3) OTC Robitussin or Delsym for cough relief. Follow directions on package. Do not exceed maximum dose. May cause drowsiness. If you have high blood pressure or kidney problems, avoid cough medicines that contain decongestants  such as pseudoephedrine, ephedrine, phenylephrine, naphazoline and oxymetazoline. 4) Use an OTC decongestant nasal spray such as Flonase, Nasonex, or Rhinocort. These contain a steroid and will help reduce congestion. You can spray 2x in each nostril two times a day for 3-5 days. Use the opposite hand of the nostril you are spraying and look down at your toes when you administer the nasal spray. This ensures the spray is applied to the nasal tissue properly. If you use Afrin for nasal congestion, DO NOT use for more than 5 days (due to rebound congestion) Saline nasal spray can be used daily and will help restore moisture to dry nasal passages, wash away allergens and can help prevent inflammation of the mucous membranes. 5) Mucinex (guaifenesin) helps thin mucus and may make it easier to clear it from head, throat and lungs. 6) Increase your fluid consumption, especially water. Eat a well-balanced diet and avoid dairy and sugar as these can increase or thicken congestion. 7) Warm salt water gargle can help alleviate sore throat (1 teaspoon in 8 oz warm water) 8) Honey is a natural cough suppressor - can take a teaspoon of honey for cough or mix with hot tea. Local honey can help inoculate against local pollen that causes allergic reactions. (It has to be local honey from our area. You can usually find local honey at [employer].) 9) Humidify air, especially in bedroom at night, as it may help with nasal and throat dryness. Breathing in steam from a shower may help loosen mucus and soothe an irritated throat. 10) Get plenty of rest! 11) A warm soak in a bath can help ease muscle and body aches. Add 1 cup of epsom salt to water (Dr. Rebecca Chandler and spearmint is a good one- at [facility] for around $6). 
 
12) Emergen C or Airbourne - vitamin supplements containing high doses of vitamin C, Vitamin B12 and B6 that can is marketed to help prevent or stop colds (although this has not been confirmed by scientific research) If symptoms persist for more than 10 days or if you have a fever above 102, please call the office. Complications of URI include an infection of the skin around the eye and other infections. Watch for signs of fever, chills, body aches or any areas of red, warm, swollen and tender skin. Call immediately if you notice these signs. Introducing [facility]! [facility] introduces [employer] patient portal. Now you can access parts of your medical record, email your doctor's office, and request medication refills online. 1. In your internet browser, go to [email or web address] "[email or web address]"/[email or web address] 2. Click on the First Time User? Click Here link in the Sign In box. You will see the New Member Sign Up page. 3. Enter your [employer] Access Code exactly as it appears below. You will not need to use this code after youve completed the sign-up process. If you do not sign up before the expiration date, you must request a new code. · [employer] Access Code: [email or web address] Expires: 6/24/2018  3:44 PM 
 
4. Enter the last four digits of your Social Security Number (xxxx) and Date of Birth (mm/dd/yyyy) as indicated and click Submit. You will be taken to the next sign-up page. 5. Create a [employer] ID. This will be your [employer] login ID and cannot be changed, so think of one that is secure and easy to remember. 6. Create a [employer] password. You can change your password at any time. 7. Enter your Password Reset Question and Answer. This can be used at a later time if you forget your password. 8. Enter your e-mail address. You will receive e-mail notification when new information is available in [address] 9. Click Sign Up. You can now view and download portions of your medical record. 10. Click the Download Summary menu link to download a portable copy of your medical information. If you have questions, please visit the Frequently Asked Questions section of the [employer] website. Remember, [employer] is NOT to be used for urgent needs. For medical emergencies, dial 911. Now available from your iPhone and Android! Please provide this summary of care documentation to your next provider. Your primary care clinician is listed as [phone] If you have any questions after today's visit, please call [phone].

## 2018-04-02 DIAGNOSIS — H65.111 ACUTE MUCOID OTITIS MEDIA OF RIGHT EAR: ICD-10-CM

## 2018-04-02 NOTE — TELEPHONE ENCOUNTER
Patient has not improved from the [address] [place] [address] and usually it takes 2. She has ear pain, sore throat, and is coughing up yellow mucus. Med to be called to the pharmacy. Patient told if not better after completing medication then she will need an OV. Med called to the Pharmacy.

## 2018-04-03 RX ORDER — AZITHROMYCIN 250 MG/1
TABLET, FILM COATED ORAL
Qty: 6 TAB | Refills: 0 | OUTPATIENT
Start: 2018-04-03 | End: 2019-06-23

## 2018-06-10 DIAGNOSIS — F51.02 INSOMNIA DUE TO STRESS: ICD-10-CM

## 2018-06-11 RX ORDER — TRAZODONE HYDROCHLORIDE 100 MG/1
TABLET ORAL
Qty: 135 TAB | Refills: 0 | Status: SHIPPED | OUTPATIENT
Start: 2018-06-11 | End: 2018-09-06 | Stop reason: SDUPTHER

## 2018-12-07 DIAGNOSIS — F51.02 INSOMNIA DUE TO STRESS: ICD-10-CM

## 2018-12-07 NOTE — TELEPHONE ENCOUNTER
Requested Prescriptions     Pending Prescriptions Disp Refills    traZODone (DESYREL) 100 mg tablet 135 Tab 0

## 2018-12-07 NOTE — TELEPHONE ENCOUNTER
PCP: Davida Crow MD    Last appt: 3/26/2018  No future appointments.     Requested Prescriptions     Pending Prescriptions Disp Refills    traZODone (DESYREL) 100 mg tablet 135 Tab 0       Prior labs and Blood pressures:  BP Readings from Last 3 Encounters:   03/26/18 117/79   05/17/17 119/78   06/30/16 112/69     Lab Results   Component Value Date/Time    Sodium 142 05/17/2017 10:30 AM    Potassium 4.7 05/17/2017 10:30 AM    Chloride 103 05/17/2017 10:30 AM    CO2 25 05/17/2017 10:30 AM    Anion gap 4 (L) 02/10/2013 05:21 PM    Glucose 100 (H) 05/17/2017 10:30 AM    BUN 14 05/17/2017 10:30 AM    Creatinine 0.88 05/17/2017 10:30 AM    BUN/Creatinine ratio 16 05/17/2017 10:30 AM    GFR est AA 86 05/17/2017 10:30 AM    GFR est non-AA 74 05/17/2017 10:30 AM    Calcium 9.5 05/17/2017 10:30 AM     Lab Results   Component Value Date/Time    Hemoglobin A1c 5.9 (H) 05/17/2017 10:30 AM     Lab Results   Component Value Date/Time    Cholesterol, total 270 (H) 05/17/2017 10:30 AM    HDL Cholesterol 77 05/17/2017 10:30 AM    LDL, calculated 166 (H) 05/17/2017 10:30 AM    VLDL, calculated 27 05/17/2017 10:30 AM    Triglyceride 137 05/17/2017 10:30 AM    CHOL/HDL Ratio 3.0 09/03/2009 08:48 AM     Lab Results   Component Value Date/Time    Vitamin D 25-Hydroxy 34.2 08/12/2011 09:32 AM    VITAMIN D, 25-HYDROXY 41.5 05/17/2017 10:30 AM       Lab Results   Component Value Date/Time    TSH 0.760 05/17/2017 10:30 AM

## 2018-12-11 RX ORDER — TRAZODONE HYDROCHLORIDE 100 MG/1
TABLET ORAL
Qty: 45 TAB | Refills: 0 | Status: SHIPPED | OUTPATIENT
Start: 2018-12-11 | End: 2019-01-07 | Stop reason: SDUPTHER

## 2018-12-18 DIAGNOSIS — Z01.89 LABORATORY EXAMINATION: ICD-10-CM

## 2018-12-18 DIAGNOSIS — E78.00 PURE HYPERCHOLESTEROLEMIA: Primary | ICD-10-CM

## 2018-12-22 LAB
ALBUMIN SERPL-MCNC: 4.7 G/DL (ref 3.5–5.5)
ALBUMIN/GLOB SERPL: 2 {RATIO} (ref 1.2–2.2)
ALP SERPL-CCNC: 84 IU/L (ref 39–117)
ALT SERPL-CCNC: 20 IU/L (ref 0–32)
APPEARANCE UR: CLEAR
AST SERPL-CCNC: 23 IU/L (ref 0–40)
BASOPHILS # BLD AUTO: 0 X10E3/UL (ref 0–0.2)
BASOPHILS NFR BLD AUTO: 1 %
BILIRUB SERPL-MCNC: 0.5 MG/DL (ref 0–1.2)
BILIRUB UR QL STRIP: NEGATIVE
BUN SERPL-MCNC: 17 MG/DL (ref 6–24)
BUN/CREAT SERPL: 18 (ref 9–23)
CALCIUM SERPL-MCNC: 9.7 MG/DL (ref 8.7–10.2)
CHLORIDE SERPL-SCNC: 102 MMOL/L (ref 96–106)
CHOLEST SERPL-MCNC: 263 MG/DL (ref 100–199)
CO2 SERPL-SCNC: 26 MMOL/L (ref 20–29)
COLOR UR: YELLOW
CREAT SERPL-MCNC: 0.96 MG/DL (ref 0.57–1)
EOSINOPHIL # BLD AUTO: 0.1 X10E3/UL (ref 0–0.4)
EOSINOPHIL NFR BLD AUTO: 4 %
ERYTHROCYTE [DISTWIDTH] IN BLOOD BY AUTOMATED COUNT: 12.4 % (ref 12.3–15.4)
GLOBULIN SER CALC-MCNC: 2.3 G/DL (ref 1.5–4.5)
GLUCOSE SERPL-MCNC: 114 MG/DL (ref 65–99)
GLUCOSE UR QL: NEGATIVE
HCT VFR BLD AUTO: 42.6 % (ref 34–46.6)
HDLC SERPL-MCNC: 81 MG/DL
HGB BLD-MCNC: 13.8 G/DL (ref 11.1–15.9)
HGB UR QL STRIP: NEGATIVE
IMM GRANULOCYTES # BLD: 0 X10E3/UL (ref 0–0.1)
IMM GRANULOCYTES NFR BLD: 0 %
INTERPRETATION, 910389: NORMAL
KETONES UR QL STRIP: NEGATIVE
LDLC SERPL CALC-MCNC: 162 MG/DL (ref 0–99)
LEUKOCYTE ESTERASE UR QL STRIP: NEGATIVE
LYMPHOCYTES # BLD AUTO: 1.4 X10E3/UL (ref 0.7–3.1)
LYMPHOCYTES NFR BLD AUTO: 41 %
MCH RBC QN AUTO: 29.4 PG (ref 26.6–33)
MCHC RBC AUTO-ENTMCNC: 32.4 G/DL (ref 31.5–35.7)
MCV RBC AUTO: 91 FL (ref 79–97)
MICRO URNS: NORMAL
MONOCYTES # BLD AUTO: 0.2 X10E3/UL (ref 0.1–0.9)
MONOCYTES NFR BLD AUTO: 7 %
NEUTROPHILS # BLD AUTO: 1.7 X10E3/UL (ref 1.4–7)
NEUTROPHILS NFR BLD AUTO: 47 %
NITRITE UR QL STRIP: NEGATIVE
PH UR STRIP: 6.5 [PH] (ref 5–7.5)
PLATELET # BLD AUTO: 243 X10E3/UL (ref 150–379)
POTASSIUM SERPL-SCNC: 4.7 MMOL/L (ref 3.5–5.2)
PROT SERPL-MCNC: 7 G/DL (ref 6–8.5)
PROT UR QL STRIP: NORMAL
RBC # BLD AUTO: 4.7 X10E6/UL (ref 3.77–5.28)
SODIUM SERPL-SCNC: 143 MMOL/L (ref 134–144)
SP GR UR: 1.03 (ref 1–1.03)
TRIGL SERPL-MCNC: 102 MG/DL (ref 0–149)
TSH SERPL DL<=0.005 MIU/L-ACNC: 0.85 UIU/ML (ref 0.45–4.5)
UROBILINOGEN UR STRIP-MCNC: 0.2 MG/DL (ref 0.2–1)
VLDLC SERPL CALC-MCNC: 20 MG/DL (ref 5–40)
WBC # BLD AUTO: 3.5 X10E3/UL (ref 3.4–10.8)

## 2019-01-07 DIAGNOSIS — F51.02 INSOMNIA DUE TO STRESS: ICD-10-CM

## 2019-01-07 NOTE — PROGRESS NOTES
Mod inc lipids as before. Prev heart scan with 0 score. Rec low dose statin to get LDL < 160.   Rest labs O.K.

## 2019-01-07 NOTE — TELEPHONE ENCOUNTER
PCP: Cara Huerta MD    Last appt: 12/21/2018  No future appointments.     Requested Prescriptions     Pending Prescriptions Disp Refills    traZODone (DESYREL) 100 mg tablet [Pharmacy Med Name: TRAZODONE 100 MG TABLET] 45 Tab 0     Sig: TAKE 1 TO 1 AND 1/2 TABLETS BY MOUTH NIGHTLY AT BEDTIME       Prior labs and Blood pressures:  BP Readings from Last 3 Encounters:   03/26/18 117/79   05/17/17 119/78   06/30/16 112/69     Lab Results   Component Value Date/Time    Sodium 143 12/21/2018 08:28 AM    Potassium 4.7 12/21/2018 08:28 AM    Chloride 102 12/21/2018 08:28 AM    CO2 26 12/21/2018 08:28 AM    Anion gap 4 (L) 02/10/2013 05:21 PM    Glucose 114 (H) 12/21/2018 08:28 AM    BUN 17 12/21/2018 08:28 AM    Creatinine 0.96 12/21/2018 08:28 AM    BUN/Creatinine ratio 18 12/21/2018 08:28 AM    GFR est AA 76 12/21/2018 08:28 AM    GFR est non-AA 66 12/21/2018 08:28 AM    Calcium 9.7 12/21/2018 08:28 AM     Lab Results   Component Value Date/Time    Hemoglobin A1c 5.9 (H) 05/17/2017 10:30 AM     Lab Results   Component Value Date/Time    Cholesterol, total 263 (H) 12/21/2018 08:28 AM    HDL Cholesterol 81 12/21/2018 08:28 AM    LDL, calculated 162 (H) 12/21/2018 08:28 AM    VLDL, calculated 20 12/21/2018 08:28 AM    Triglyceride 102 12/21/2018 08:28 AM    CHOL/HDL Ratio 3.0 09/03/2009 08:48 AM     Lab Results   Component Value Date/Time    Vitamin D 25-Hydroxy 34.2 08/12/2011 09:32 AM    VITAMIN D, 25-HYDROXY 41.5 05/17/2017 10:30 AM       Lab Results   Component Value Date/Time    TSH 0.855 12/21/2018 08:28 AM

## 2019-01-08 RX ORDER — TRAZODONE HYDROCHLORIDE 100 MG/1
TABLET ORAL
Qty: 135 TAB | Refills: 1 | Status: SHIPPED | OUTPATIENT
Start: 2019-01-08 | End: 2019-04-03 | Stop reason: SDUPTHER

## 2019-04-03 DIAGNOSIS — F51.02 INSOMNIA DUE TO STRESS: ICD-10-CM

## 2019-04-04 RX ORDER — TRAZODONE HYDROCHLORIDE 100 MG/1
TABLET ORAL
Qty: 135 TAB | Refills: 0 | Status: SHIPPED | OUTPATIENT
Start: 2019-04-04 | End: 2019-10-02 | Stop reason: SDUPTHER

## 2019-04-04 NOTE — TELEPHONE ENCOUNTER
PCP: Peyman Yan MD    Last appt: 12/21/2018  No future appointments.     Requested Prescriptions     Pending Prescriptions Disp Refills    traZODone (DESYREL) 100 mg tablet [Pharmacy Med Name: TRAZODONE 100 MG TABLET] 135 Tab 0     Sig: TAKE 1 TO 1 AND 1/2 TABLETS BY MOUTH NIGHTLY AT BEDTIME       Prior labs and Blood pressures:  BP Readings from Last 3 Encounters:   03/26/18 117/79   05/17/17 119/78   06/30/16 112/69     Lab Results   Component Value Date/Time    Sodium 143 12/21/2018 08:28 AM    Potassium 4.7 12/21/2018 08:28 AM    Chloride 102 12/21/2018 08:28 AM    CO2 26 12/21/2018 08:28 AM    Anion gap 4 (L) 02/10/2013 05:21 PM    Glucose 114 (H) 12/21/2018 08:28 AM    BUN 17 12/21/2018 08:28 AM    Creatinine 0.96 12/21/2018 08:28 AM    BUN/Creatinine ratio 18 12/21/2018 08:28 AM    GFR est AA 76 12/21/2018 08:28 AM    GFR est non-AA 66 12/21/2018 08:28 AM    Calcium 9.7 12/21/2018 08:28 AM     Lab Results   Component Value Date/Time    Hemoglobin A1c 5.9 (H) 05/17/2017 10:30 AM     Lab Results   Component Value Date/Time    Cholesterol, total 263 (H) 12/21/2018 08:28 AM    HDL Cholesterol 81 12/21/2018 08:28 AM    LDL, calculated 162 (H) 12/21/2018 08:28 AM    VLDL, calculated 20 12/21/2018 08:28 AM    Triglyceride 102 12/21/2018 08:28 AM    CHOL/HDL Ratio 3.0 09/03/2009 08:48 AM     Lab Results   Component Value Date/Time    Vitamin D 25-Hydroxy 34.2 08/12/2011 09:32 AM    VITAMIN D, 25-HYDROXY 41.5 05/17/2017 10:30 AM       Lab Results   Component Value Date/Time    TSH 0.855 12/21/2018 08:28 AM

## 2019-06-21 ENCOUNTER — TELEPHONE (OUTPATIENT)
Dept: FAMILY MEDICINE CLINIC | Age: 59
End: 2019-06-21

## 2019-06-21 NOTE — TELEPHONE ENCOUNTER
Spoke with patient after obtaining 2 patient identifiers. Patient is having really bad side pain. Patient said she has a dx of diverticulitis and consumed rice last night. She wanted to know if she could have a xray done here. Patient denied any complaint of urinary issue. Patient advised that writer was unable to say what was causing the pain and was advised to go to urgent care if pain is sharp and will not go away. Patient stated she felt it was no need  To be seen if she couldn't have an xray.

## 2019-06-23 ENCOUNTER — OFFICE VISIT (OUTPATIENT)
Dept: URGENT CARE | Age: 59
End: 2019-06-23

## 2019-06-23 VITALS
TEMPERATURE: 97 F | WEIGHT: 173 LBS | DIASTOLIC BLOOD PRESSURE: 79 MMHG | OXYGEN SATURATION: 97 % | BODY MASS INDEX: 27.8 KG/M2 | SYSTOLIC BLOOD PRESSURE: 136 MMHG | HEIGHT: 66 IN | RESPIRATION RATE: 18 BRPM | HEART RATE: 89 BPM

## 2019-06-23 DIAGNOSIS — K59.00 CONSTIPATION, UNSPECIFIED CONSTIPATION TYPE: Primary | ICD-10-CM

## 2019-06-23 DIAGNOSIS — R10.32 LLQ PAIN: ICD-10-CM

## 2019-06-23 PROBLEM — R30.0 DYSURIA: Status: ACTIVE | Noted: 2019-06-23

## 2019-06-23 LAB
BILIRUB UR QL STRIP: NEGATIVE
GLUCOSE UR-MCNC: NEGATIVE MG/DL
KETONES P FAST UR STRIP-MCNC: NEGATIVE MG/DL
PH UR STRIP: 6 [PH] (ref 4.6–8)
PROT UR QL STRIP: NEGATIVE
SP GR UR STRIP: 1.02 (ref 1–1.03)
UA UROBILINOGEN AMB POC: NORMAL (ref 0.2–1)
URINALYSIS CLARITY POC: NORMAL
URINALYSIS COLOR POC: NORMAL
URINE BLOOD POC: NORMAL
URINE LEUKOCYTES POC: NORMAL
URINE NITRITES POC: NEGATIVE

## 2019-06-23 RX ORDER — PLECANATIDE 3 MG/1
TABLET ORAL
Refills: 3 | COMMUNITY
Start: 2019-04-29 | End: 2021-11-04

## 2019-06-23 RX ORDER — DICYCLOMINE HYDROCHLORIDE 20 MG/1
20 TABLET ORAL EVERY 6 HOURS
Qty: 12 TAB | Refills: 0 | Status: SHIPPED | OUTPATIENT
Start: 2019-06-23 | End: 2019-11-20

## 2019-06-23 RX ORDER — CIPROFLOXACIN 500 MG/1
500 TABLET ORAL 2 TIMES DAILY
Qty: 20 TAB | Refills: 0 | Status: SHIPPED | OUTPATIENT
Start: 2019-06-23 | End: 2019-07-03

## 2019-06-23 NOTE — PROGRESS NOTES
Abdominal Pain    The history is provided by the patient. This is a new problem. The current episode started 2 days ago. The problem occurs constantly. The problem has been gradually worsening. The pain is associated with vomiting. The pain is at a severity of 6/10. The pain is moderate. Associated symptoms include constipation (h/o chronic constipation - no BM x 2 days). Pertinent negatives include no fever, no diarrhea, no nausea and no vomiting.         Past Medical History:   Diagnosis Date    Atypical chest pain 1/7/10    Constipation, chronic     chronic issue but 7/10/15 [facility]  17 f/u note from [address] [place] [address] dermatitis and other eczema, due to unspecified cause     Diverticula of colon     Estrogen deficiency 2009    H/O cold sores     Hypercholesterolemia     Insomnia     Nausea & vomiting     Other pyelonephritis or pyonephrosis, not specified as acute or chronic 1980    [facility]itis [facility]    Psychiatric disorder     anxiety    Radius fracture 2013    left wrist @ [facility] then to TOSS        Past Surgical History:   Procedure Laterality Date    HX  SECTION      X1    HX DILATION AND CURETTAGE      X2    HX ENDOSCOPY  2017    [facility]--path rec'd and normal mucosa    HX GYN  06    [facility] BSO    HX HYSTERECTOMY      HX ORTHOPAEDIC  05    L knee ant cruc repair    HX OTHER SURGICAL      exploratory lap    [facility]    PA COLONOSCOPY FLX DX W/COLLJ SPEC WHEN PFRMD  2011              Family History   Problem Relation Age of Onset    Hypertension Mother     Thyroid Disease Mother     Heart Disease Maternal Grandmother     Diabetes Maternal Grandmother     Hypertension Brother         Social History     Socioeconomic History    Marital status:      Spouse name: Not on file    Number of children: Not on file    Years of education: Not on file    Highest education level: Not on file Occupational History    Not on file   Social Needs    Financial resource strain: Not on file    Food insecurity:     Worry: Not on file     Inability: Not on file    Transportation needs:     Medical: Not on file     Non-medical: Not on file   Tobacco Use    Smoking status: Never Smoker    Smokeless tobacco: Never Used   Substance and Sexual Activity    Alcohol use: Yes     Alcohol/week: 0.0 oz     Comment: 3 drinks/week    Drug use: No     Types: Prescription, OTC    Sexual activity: Yes     Partners: Male   Lifestyle    Physical activity:     Days per week: Not on file     Minutes per session: Not on file    Stress: Not on file   Relationships    Social connections:     Talks on phone: Not on file     Gets together: Not on file     Attends Muslim service: Not on file     Active member of club or organization: Not on file     Attends meetings of clubs or organizations: Not on file     Relationship status: Not on file    Intimate partner violence:     Fear of current or ex partner: Not on file     Emotionally abused: Not on file     Physically abused: Not on file     Forced sexual activity: Not on file   Other Topics Concern    Not on file   Social History Narrative    Not on file                ALLERGIES: Codeine; Pcn [penicillins]; and Sulfa (sulfonamide antibiotics)    Review of Systems   Constitutional: Negative for chills and fever. Gastrointestinal: Positive for abdominal pain and constipation (h/o chronic constipation - no BM x 2 days). Negative for blood in stool, diarrhea, nausea and vomiting. All other systems reviewed and are negative. Vitals:    06/23/19 0845   BP: 136/79   Pulse: 89   Resp: 18   Temp: 97 °F (36.1 °C)   SpO2: 97%   Weight: 173 lb (78.5 kg)   Height: 5' 5.5\" (1.664 m)       Physical Exam   Abdominal: Soft. She exhibits no distension. There is tenderness in the left lower quadrant. There is guarding. There is no rebound and no CVA tenderness.        Nursing note and vitals reviewed. MDM    Procedures      ICD-10-CM ICD-9-CM    1. Constipation, unspecified constipation type K59.00 564.00 AMB POC URINALYSIS DIP STICK AUTO W/ MICRO   2. LLQ pain R10.32 789.04 XR ABD FLAT/ ERECT    h/o divericulitis       Low fiber diet- clear fluids    If sxs worsen go to ED      Medications Ordered Today   Medications    ciprofloxacin HCl (CIPRO) 500 mg tablet     Sig: Take 1 Tab by mouth two (2) times a day for 10 days. Dispense:  20 Tab     Refill:  0    dicyclomine (BENTYL) 20 mg tablet     Sig: Take 1 Tab by mouth every six (6) hours. Dispense:  12 Tab     Refill:  0     Results for orders placed or performed in visit on 06/23/19   XR ABD FLAT/ ERECT    Narrative    INDICATION: Left lower quadrant abdominal pain. Exam: Supine and upright views of the abdomen. FINDINGS: There is a nonspecific bowel gas pattern. No dilated loop of bowel or  air-fluid level is visualized. Soft tissue detail is normal. No free air is  demonstrated. There are no unusual calcifications. Impression    IMPRESSION: Nonspecific bowel gas pattern. No evidence of perforation. Results for orders placed or performed in visit on 06/23/19   AMB POC URINALYSIS DIP STICK AUTO W/ MICRO   Result Value Ref Range    Color (UA POC)      Clarity (UA POC)      Glucose (UA POC) Negative Negative    Bilirubin (UA POC) Negative Negative    Ketones (UA POC) Negative Negative    Specific gravity (UA POC) 1.020 1.001 - 1.035    Blood (UA POC) Trace Negative    pH (UA POC) 6 4.6 - 8.0    Protein (UA POC) Negative Negative    Urobilinogen (UA POC) 0.2 mg/dL 0.2 - 1    Nitrites (UA POC) Negative Negative    Leukocyte esterase (UA POC) Trace Negative     The patients condition was discussed with the patient and they understand. The patient is to follow up with primary care doctor. If signs and symptoms become worse the pt is to go to the ER. The patient is to take medications as prescribed.

## 2019-06-23 NOTE — PATIENT INSTRUCTIONS

## 2019-08-10 ENCOUNTER — HOSPITAL ENCOUNTER (OUTPATIENT)
Dept: CT IMAGING | Age: 59
Discharge: HOME | End: 2019-08-10
Attending: INTERNAL MEDICINE
Payer: COMMERCIAL

## 2019-08-10 DIAGNOSIS — R10.32 LLQ ABDOMINAL PAIN: ICD-10-CM

## 2019-08-10 DIAGNOSIS — K59.00 CONSTIPATION: ICD-10-CM

## 2019-08-10 DIAGNOSIS — K63.5 COLON POLYP: ICD-10-CM

## 2019-08-10 PROCEDURE — 74011000255 HC RX REV CODE- 255: Performed by: INTERNAL MEDICINE

## 2019-08-10 PROCEDURE — 74177 CT ABD & PELVIS W/CONTRAST: CPT

## 2019-08-10 PROCEDURE — 74011636320 HC RX REV CODE- 636/320: Performed by: INTERNAL MEDICINE

## 2019-08-10 RX ORDER — BARIUM SULFATE 20 MG/ML
900 SUSPENSION ORAL
Status: COMPLETED | OUTPATIENT
Start: 2019-08-10 | End: 2019-08-10

## 2019-08-10 RX ORDER — SODIUM CHLORIDE 0.9 % (FLUSH) 0.9 %
10 SYRINGE (ML) INJECTION
Status: COMPLETED | OUTPATIENT
Start: 2019-08-10 | End: 2019-08-10

## 2019-08-10 RX ADMIN — IOPAMIDOL 100 ML: 755 INJECTION, SOLUTION INTRAVENOUS at 10:00

## 2019-08-10 RX ADMIN — Medication 10 ML: at 10:00

## 2019-08-10 RX ADMIN — BARIUM SULFATE 900 ML: 20 SUSPENSION ORAL at 11:00

## 2019-09-30 DIAGNOSIS — F51.02 INSOMNIA DUE TO STRESS: ICD-10-CM

## 2019-10-02 RX ORDER — TRAZODONE HYDROCHLORIDE 100 MG/1
TABLET ORAL
Qty: 135 TAB | Refills: 0 | Status: SHIPPED | OUTPATIENT
Start: 2019-10-02 | End: 2019-12-27 | Stop reason: SDUPTHER

## 2019-10-04 DIAGNOSIS — F51.02 INSOMNIA DUE TO STRESS: ICD-10-CM

## 2019-10-04 RX ORDER — TRAZODONE HYDROCHLORIDE 100 MG/1
TABLET ORAL
Qty: 135 TAB | Refills: 0 | Status: CANCELLED | OUTPATIENT
Start: 2019-10-04

## 2019-11-07 DIAGNOSIS — Z01.89 LABORATORY EXAMINATION: ICD-10-CM

## 2019-11-07 DIAGNOSIS — E78.00 PURE HYPERCHOLESTEROLEMIA: Primary | ICD-10-CM

## 2019-11-13 LAB
25(OH)D3+25(OH)D2 SERPL-MCNC: 30.9 NG/ML (ref 30–100)
ALBUMIN SERPL-MCNC: 4.3 G/DL (ref 3.5–5.5)
ALBUMIN/GLOB SERPL: 1.9 {RATIO} (ref 1.2–2.2)
ALP SERPL-CCNC: 79 IU/L (ref 39–117)
ALT SERPL-CCNC: 21 IU/L (ref 0–32)
APPEARANCE UR: CLEAR
AST SERPL-CCNC: 25 IU/L (ref 0–40)
BASOPHILS # BLD AUTO: 0 X10E3/UL (ref 0–0.2)
BASOPHILS NFR BLD AUTO: 1 %
BILIRUB SERPL-MCNC: 0.4 MG/DL (ref 0–1.2)
BILIRUB UR QL STRIP: NEGATIVE
BUN SERPL-MCNC: 15 MG/DL (ref 6–24)
BUN/CREAT SERPL: 16 (ref 9–23)
CALCIUM SERPL-MCNC: 9.6 MG/DL (ref 8.7–10.2)
CHLORIDE SERPL-SCNC: 102 MMOL/L (ref 96–106)
CHOLEST SERPL-MCNC: 238 MG/DL (ref 100–199)
CO2 SERPL-SCNC: 24 MMOL/L (ref 20–29)
COLOR UR: YELLOW
CREAT SERPL-MCNC: 0.91 MG/DL (ref 0.57–1)
EOSINOPHIL # BLD AUTO: 0.1 X10E3/UL (ref 0–0.4)
EOSINOPHIL NFR BLD AUTO: 4 %
ERYTHROCYTE [DISTWIDTH] IN BLOOD BY AUTOMATED COUNT: 11.8 % (ref 12.3–15.4)
GLOBULIN SER CALC-MCNC: 2.3 G/DL (ref 1.5–4.5)
GLUCOSE SERPL-MCNC: 104 MG/DL (ref 65–99)
GLUCOSE UR QL: NEGATIVE
HCT VFR BLD AUTO: 37.9 % (ref 34–46.6)
HDLC SERPL-MCNC: 69 MG/DL
HGB BLD-MCNC: 13.1 G/DL (ref 11.1–15.9)
HGB UR QL STRIP: NEGATIVE
IMM GRANULOCYTES # BLD AUTO: 0 X10E3/UL (ref 0–0.1)
IMM GRANULOCYTES NFR BLD AUTO: 0 %
INTERPRETATION, 910389: NORMAL
KETONES UR QL STRIP: NEGATIVE
LDLC SERPL CALC-MCNC: 142 MG/DL (ref 0–99)
LEUKOCYTE ESTERASE UR QL STRIP: NEGATIVE
LYMPHOCYTES # BLD AUTO: 1.1 X10E3/UL (ref 0.7–3.1)
LYMPHOCYTES NFR BLD AUTO: 33 %
MCH RBC QN AUTO: 30.1 PG (ref 26.6–33)
MCHC RBC AUTO-ENTMCNC: 34.6 G/DL (ref 31.5–35.7)
MCV RBC AUTO: 87 FL (ref 79–97)
MICRO URNS: NORMAL
MONOCYTES # BLD AUTO: 0.2 X10E3/UL (ref 0.1–0.9)
MONOCYTES NFR BLD AUTO: 7 %
NEUTROPHILS # BLD AUTO: 1.7 X10E3/UL (ref 1.4–7)
NEUTROPHILS NFR BLD AUTO: 55 %
NITRITE UR QL STRIP: NEGATIVE
PH UR STRIP: 5.5 [PH] (ref 5–7.5)
PLATELET # BLD AUTO: 196 X10E3/UL (ref 150–450)
POTASSIUM SERPL-SCNC: 4.2 MMOL/L (ref 3.5–5.2)
PROT SERPL-MCNC: 6.6 G/DL (ref 6–8.5)
PROT UR QL STRIP: NEGATIVE
RBC # BLD AUTO: 4.35 X10E6/UL (ref 3.77–5.28)
SODIUM SERPL-SCNC: 142 MMOL/L (ref 134–144)
SP GR UR: 1.02 (ref 1–1.03)
TRIGL SERPL-MCNC: 133 MG/DL (ref 0–149)
TSH SERPL DL<=0.005 MIU/L-ACNC: 0.83 UIU/ML (ref 0.45–4.5)
UROBILINOGEN UR STRIP-MCNC: 0.2 MG/DL (ref 0.2–1)
VLDLC SERPL CALC-MCNC: 27 MG/DL (ref 5–40)
WBC # BLD AUTO: 3.2 X10E3/UL (ref 3.4–10.8)

## 2019-11-20 ENCOUNTER — OFFICE VISIT (OUTPATIENT)
Dept: FAMILY MEDICINE CLINIC | Age: 59
End: 2019-11-20

## 2019-11-20 VITALS
WEIGHT: 179 LBS | BODY MASS INDEX: 28.77 KG/M2 | HEIGHT: 66 IN | TEMPERATURE: 97.6 F | RESPIRATION RATE: 18 BRPM | SYSTOLIC BLOOD PRESSURE: 111 MMHG | OXYGEN SATURATION: 97 % | DIASTOLIC BLOOD PRESSURE: 75 MMHG | HEART RATE: 88 BPM

## 2019-11-20 DIAGNOSIS — Z00.00 PHYSICAL EXAM: Primary | ICD-10-CM

## 2019-11-20 DIAGNOSIS — R14.0 POSTPRANDIAL ABDOMINAL BLOATING: ICD-10-CM

## 2019-11-20 DIAGNOSIS — R10.814 LEFT LOWER QUADRANT ABDOMINAL TENDERNESS WITHOUT REBOUND TENDERNESS: ICD-10-CM

## 2019-11-20 NOTE — PROGRESS NOTES
Georgia Harding  Identified pt with two pt identifiers(name and ). Chief Complaint   Patient presents with    Physical    GI Problem     bloating, abd pain X 6 months. patient has seen her GI provider        1. Have you been to the ER, urgent care clinic since your last visit? Hospitalized since your last visit? NO    2. Have you seen or consulted any other health care providers outside of the [address] [place] [address] since your last visit? Include any pap smears or colon screening. NO      Provider notified of reason for visit, vitals and flowsheets obtained on patients.      Patient received paperwork for advance directive during previous visit but has not completed at this time     Reviewed record In preparation for visit, huddled with provider and have obtained necessary documentation      Health Maintenance Due   Topic    PAP AKA CERVICAL CYTOLOGY     Shingrix Vaccine Age 49> (2 of 2)    BREAST CANCER SCRN MAMMOGRAM        Wt Readings from Last 3 Encounters:   19 179 lb (81.2 kg)   19 173 lb (78.5 kg)   18 158 lb 4.8 oz (71.8 kg)     Temp Readings from Last 3 Encounters:   19 97.6 °F (36.4 °C) (Oral)   19 97 °F (36.1 °C)   18 98.4 °F (36.9 °C) (Oral)     BP Readings from Last 3 Encounters:   19 111/75   19 136/79   18 117/79     Pulse Readings from Last 3 Encounters:   19 88   19 89   18 85     Vitals:    19 1321   BP: 111/75   Pulse: 88   Resp: 18   Temp: 97.6 °F (36.4 °C)   TempSrc: Oral   SpO2: 97%   Weight: 179 lb (81.2 kg)   Height: 5' 5.5\" (1.664 m)   PainSc:   8   PainLoc: Abdomen         Learning Assessment:  :     Learning Assessment 2015   PRIMARY LEARNER Patient   PRIMARY LANGUAGE ENGLISH   LEARNER PREFERENCE PRIMARY READING     LISTENING     DEMONSTRATION   ANSWERED BY patient   RELATIONSHIP SELF       Depression Screening:  :     3 most recent PHQ Screens 2019   Little interest or pleasure in doing things Not at all   Feeling down, depressed, irritable, or hopeless Not at all   Total Score PHQ 2 0   Trouble falling or staying asleep, or sleeping too much Nearly every day   Feeling tired or having little energy More than half the days   Poor appetite, weight loss, or overeating Nearly every day   Feeling bad about yourself - or that you are a failure or have let yourself or your family down Not at all   Trouble concentrating on things such as school, work, reading, or watching TV Not at all   Moving or speaking so slowly that other people could have noticed; or the opposite being so fidgety that others notice Not at all   Thoughts of being better off dead, or hurting yourself in some way Not at all   PHQ 9 Score 8   How difficult have these problems made it for you to do your work, take care of your home and get along with others Not difficult at all         ADL Screening:  :     ADL Assessment 3/26/2018   Feeding yourself No Help Needed   Getting from bed to chair No Help Needed   Getting dressed No Help Needed   Bathing or showering No Help Needed   Walk across the room (includes cane/walker) No Help Needed   Using the telphone No Help Needed   Taking your medications No Help Needed   Preparing meals No Help Needed   Managing money (expenses/bills) No Help Needed   Moderately strenuous housework (laundry) No Help Needed   Shopping for personal items (toiletries/medicines) No Help Needed   Shopping for groceries No Help Needed   Driving No Help Needed   Climbing a flight of stairs No Help Needed   Getting to places beyond walking distances No Help Needed         Medication reconciliation up to date and corrected with patient at this time.

## 2019-11-20 NOTE — PROGRESS NOTES
HISTORY OF PRESENT ILLNESS  Georgia Allen is a 63 y.o. female. HPI   Here for [facility]. Got  last yr. Lots going on at work. Did Mediweight loss before wedding. Stopped this past Sept.  Began regaining in Spring. In Summer began having LLQ pain. Chronic constipation. Saw GI. Had colonoscopy-O.K. Hungry all time. Had EGD-O.K. Had CT scan-O.K. Not able to exercise. Hurt right knee playing golf. 20 pounds over 6 mos. Gyn past yr. Eye doctor past yr. Dentist 2 x annually. Colonoscopy '23.  5 yr repeat. Gyn and mammo past yr. DEXA > 10 yrs. Derm q 3 mos for precancer on leg. No signif hx past yr. Patient Active Problem List   Diagnosis Code    Pure hypercholesterolemia E78.00    Diverticulosis of colon K57.30    Constipation K59.00    Recurrent HSV (herpes simplex virus) B00.9    Dysuria R30.0     Current Outpatient Medications   Medication Sig Dispense Refill    traZODone (DESYREL) 100 mg tablet TAKE 1 TO 1 AND 1/2 TABLETS BY MOUTH NIGHTLY AT BEDTIME 135 Tab 0    TRULANCE 3 mg tab TAKE 1 TABLET BY MOUTH EVERY DAY  3    dicyclomine (BENTYL) 20 mg tablet Take 1 Tab by mouth every six (6) hours.  12 Tab 0    valACYclovir (VALTREX) 1 gram tablet TAKE 1/2-1 TABLET BY MOUTH TWICE DAILY FOR 3 DAYS 10 Tab 1     Allergies   Allergen Reactions    Codeine Nausea and Vomiting    Pcn [Penicillins] Hives    Sulfa (Sulfonamide Antibiotics) Hives     Past Medical History:   Diagnosis Date    Atypical chest pain 1/7/10    Constipation, chronic     chronic issue but 7/10/15 [facility] PF  7/25/17 f/u note from [facility] dermatitis and other eczema, due to unspecified cause     Diverticula of colon     Estrogen deficiency 8/7/2009    H/O cold sores     Hypercholesterolemia     Insomnia     Nausea & vomiting     Other pyelonephritis or pyonephrosis, not specified as acute or chronic 1980    [facility]itis [facility]    [facility]    Psychiatric disorder     anxiety    Radius fracture 2013    left wrist @ [facility] then to TOSS     Past Surgical History:   Procedure Laterality Date    HX  SECTION      X1    HX DILATION AND CURETTAGE      X2    HX ENDOSCOPY  2017    [facility]--path rec'd and normal mucosa    HX GYN  06    [facility] BSO    HX HYSTERECTOMY      HX ORTHOPAEDIC  05    L knee ant cruc repair    HX OTHER SURGICAL      exploratory lap    [facility]    [place] COLONOSCOPY FLX DX W/COLLJ SPEC WHEN PFRMD  2011          Family History   Problem Relation Age of Onset    Hypertension Mother     Thyroid Disease Mother     Heart Disease Maternal Grandmother     Diabetes Maternal Grandmother     Hypertension Brother      Social History     Tobacco Use    Smoking status: Never Smoker    Smokeless tobacco: Never Used   Substance Use Topics    Alcohol use: Yes     Alcohol/week: 0.0 standard drinks     Comment: 3 drinks/week      Lab Results   Component Value Date/Time    WBC 3.2 (L) 2019 09:25 AM    HGB 13.1 2019 09:25 AM    HCT 37.9 2019 09:25 AM    PLATELET 269  09:25 AM    MCV 87 2019 09:25 AM     Lab Results   Component Value Date/Time    Hemoglobin A1c 5.9 (H) 2017 10:30 AM    Glucose 104 (H) 2019 09:25 AM    LDL, calculated 142 (H) 2019 09:25 AM    Creatinine 0.91 2019 09:25 AM      Lab Results   Component Value Date/Time    Cholesterol, total 238 (H) 2019 09:25 AM    HDL Cholesterol 69 2019 09:25 AM    LDL, calculated 142 (H) 2019 09:25 AM    Triglyceride 133 2019 09:25 AM    CHOL/HDL Ratio 3.0 2009 08:48 AM     Lab Results   Component Value Date/Time    ALT (SGPT) 21 2019 09:25 AM    AST (SGOT) 25 2019 09:25 AM    Alk.  phosphatase 79 2019 09:25 AM    Bilirubin, total 0.4 2019 09:25 AM    Albumin 4.3 2019 09:25 AM    Protein, total 6.6 2019 09:25 AM    INR 1.0 2009 01:25 PM    Prothrombin time 10.1 12/31/2009 01:25 PM    PLATELET 849 61/29/9842 09:25 AM     Lab Results   Component Value Date/Time    GFR est non-AA 70 11/12/2019 09:25 AM    GFR est AA 81 11/12/2019 09:25 AM    Creatinine 0.91 11/12/2019 09:25 AM    BUN 15 11/12/2019 09:25 AM    Sodium 142 11/12/2019 09:25 AM    Potassium 4.2 11/12/2019 09:25 AM    Chloride 102 11/12/2019 09:25 AM    CO2 24 11/12/2019 09:25 AM     Lab Results   Component Value Date/Time    TSH 0.829 11/12/2019 09:25 AM      Lab Results   Component Value Date/Time    Glucose 104 (H) 11/12/2019 09:25 AM         Review of Systems   Constitutional: Negative. HENT: Negative. Eyes: Negative. Respiratory: Negative. Cardiovascular: Negative. Gastrointestinal: Positive for abdominal pain, constipation and heartburn. Genitourinary: Positive for frequency. Musculoskeletal: Negative. Skin: Negative. Neurological: Negative. Endo/Heme/Allergies: Positive for environmental allergies. Psychiatric/Behavioral: Negative. Physical Exam   Vitals:    11/20/19 1321   BP: 111/75   Pulse: 88   Resp: 18   Temp: 97.6 °F (36.4 °C)   TempSrc: Oral   SpO2: 97%   Weight: 179 lb (81.2 kg)   Height: 5' 5.5\" (1.664 m)       General    alert, cooperative, no distress, appears stated age   Head    Normocephalic, without obvious abnormality, atraumatic   Eyes    conjunctivae/corneas clear. PERRL. Fundi benign   Ears    Canals and TMs clear   Nose Passages patent. Mucosa normal. No drainage or sinus tenderness. Throat Lips, mucosa, and tongue normal. Teeth and gums normal.  Post pharynx neg. Neck supple, nontender, no adenopathy, thyroid: not enlarged, no masses/nodules, no carotid bruits   Back     symmetric, no curvature. FROM. No CVA tenderness   Lungs     clear to auscultation bilaterally   Breasts    Declined   Heart    Regular rate and rhythm, with no murmur, click, rub or gallop   Abdomen   Soft, LLQ tenderness, no guarding nor rebound.  Bowel sounds normal. No masses,  No organomegaly   Genitalia and Rectal  Deferred. Per GYN. Extremities   extremities normal, atraumatic, no cyanosis or edema   Pulses   1-2+ and symmetric   Skin No rashes or lesions       Neurologic Romberg neg, nml heel, toe and Tandem gait. DTRs 2+ symmetric except absent Achilles. ASSESSMENT and PLAN    ICD-10-CM ICD-9-CM    1. Physical exam Z00.00 V70.9    2. Left lower quadrant abdominal tenderness without rebound tenderness R10.814 789.64 REFERRAL TO GENERAL SURGERY   3. Postprandial abdominal bloating R14.0 787.3      Follow-up and Dispositions    · Return in about 1 year (around 11/20/2020) for [facility], labs.

## 2019-11-25 ENCOUNTER — OFFICE VISIT (OUTPATIENT)
Dept: SURGERY | Age: 59
End: 2019-11-25

## 2019-11-25 VITALS
HEIGHT: 66 IN | WEIGHT: 180 LBS | TEMPERATURE: 97.8 F | SYSTOLIC BLOOD PRESSURE: 136 MMHG | HEART RATE: 85 BPM | RESPIRATION RATE: 16 BRPM | DIASTOLIC BLOOD PRESSURE: 90 MMHG | BODY MASS INDEX: 28.93 KG/M2 | OXYGEN SATURATION: 98 %

## 2019-11-25 DIAGNOSIS — R10.32 LLQ PAIN: Primary | ICD-10-CM

## 2019-11-25 NOTE — PROGRESS NOTES
Chief Complaint   Patient presents with    Abdominal Pain     Patient seen at the request of Dr. Micheal Chase to evaluate abd pain. 1. Have you been to the ER, urgent care clinic since your last visit? Hospitalized since your last visit? No    2. Have you seen or consulted any other health care providers outside of the [address] [place] [address] since your last visit? Include any pap smears or colon screening.  No

## 2019-11-25 NOTE — PROGRESS NOTES
HISTORY OF PRESENT ILLNESS  Georgia Camacho is a 63 y.o. female. Saw Dr Amrita Adler  CT scan ordered by Dr. Amrita Adler 2019: increased stool burden, seems the bowel is maybe tethered down in the pelvis and a loop is possibly thickened vs just under distended. Trying trulance,     BMs slow. +constipation    Pain LLQ 4 months  Starts 5-10 minutes after eating    S/p hysterectomy in 40s  3 months later he did had a \"hernia repair\" of his incision    Has had constipation since    Dx with diverticulitis       ____________________________________________________________________________  Patient presents with:  Abdominal Pain: Patient seen at the request of Dr. Mekhi Mistry Read to evaluate abd pain. /90 (BP 1 Location: Left arm, BP Patient Position: Sitting)   Pulse 85   Temp 97.8 °F (36.6 °C) (Oral)   Resp 16   Ht 5' 5.5\" (1.664 m)   Wt 81.6 kg (180 lb)   SpO2 98%   BMI 29.50 kg/m²   Past Medical History:  1/7/10: Atypical chest pain  No date: Constipation, chronic      Comment:  chronic issue but 7/10/15 [facility]  17 f/u note                from [facility]  No date: Contact dermatitis and other eczema, due to unspecified cause  No date: Diverticula of colon  2009: Estrogen deficiency  No date: H/O cold sores  No date: Hypercholesterolemia  No date:  Insomnia  No date: Nausea & vomiting  : Other pyelonephritis or pyonephrosis, not specified as acute or   chronic  10/4/15: Pharyngitis      Comment:  [facility]  No date: Psychiatric disorder      Comment:  anxiety  2013: Radius fracture      Comment:  left wrist @ [facility] then to TOSS  Past Surgical History:  No date: HX  SECTION      Comment:  X1  No date: HX DILATION AND CURETTAGE      Comment:  X2  2017: HX ENDOSCOPY      Comment:  Eloisa Gieric rec'd and normal mucosa  06: HX GYN      Comment:  RICHARD BSO  No date: HX HYSTERECTOMY  05: HX ORTHOPAEDIC      Comment:  L knee ant cruc repair  No date: HX OTHER SURGICAL Comment:  exploratory lap  5/23/11 [facility] er: I&D ABCESS COMP/MULTIPLE  4/1/2011: [place] COLONOSCOPY FLX DX W/COLLJ SPEC WHEN PFRMD      Comment:     Social History    Socioeconomic History      Marital status:       Spouse name: Not on file      Number of children: Not on file      Years of education: Not on file      Highest education level: Not on file    Tobacco Use      Smoking status: Never Smoker      Smokeless tobacco: Never Used    Substance and Sexual Activity      Alcohol use: Yes        Alcohol/week: 0.0 standard drinks        Comment: 3 drinks/week      Drug use: No        Types: Prescription, OTC      Sexual activity: Yes        Partners: Male    Review of patient's family history indicates:  Problem: Hypertension      Relation: Mother          Age of Onset: (Not Specified)  Problem: Thyroid Disease      Relation: Mother          Age of Onset: (Not Specified)  Problem: Heart Disease      Relation: Maternal Grandmother          Age of Onset: (Not Specified)  Problem: Diabetes      Relation: Maternal Grandmother          Age of Onset: (Not Specified)  Problem: Hypertension      Relation: Brother          Age of Onset: (Not Specified)    Current Outpatient Medications:  traZODone (DESYREL) 100 mg tablet, TAKE 1 TO 1 AND 1/2 TABLETS BY MOUTH NIGHTLY AT BEDTIME  TRULANCE 3 mg tab, TAKE 1 TABLET BY MOUTH EVERY DAY    No current facility-administered medications for this visit. Allergies:  -- Codeine -- Nausea and Vomiting   -- Pcn (Penicillins) -- Hives   -- Sulfa (Sulfonamide Antibiotics) -- Hives  _____________________________________________________________________________                Abdominal Pain   The history is provided by the patient. This is a chronic problem. The current episode started more than 1 week ago. The problem occurs daily. The problem has not changed since onset. Associated symptoms include abdominal pain.  Pertinent negatives include no chest pain, no headaches and no shortness of breath. The symptoms are aggravated by eating. The symptoms are relieved by rest. The treatment provided no relief. Review of Systems   Constitutional: Negative for chills, fever and weight loss. HENT: Negative for ear pain. Eyes: Negative for pain. Respiratory: Negative for shortness of breath. Cardiovascular: Negative for chest pain. Gastrointestinal: Positive for abdominal pain. Negative for blood in stool. Genitourinary: Negative for hematuria. Musculoskeletal: Negative for joint pain. Skin: Negative for rash. Neurological: Negative for dizziness, focal weakness, seizures and headaches. Endo/Heme/Allergies: Does not bruise/bleed easily. Psychiatric/Behavioral: The patient does not have insomnia. Physical Exam  Constitutional:       General: She is not in acute distress. Appearance: She is well-developed. She is not diaphoretic. HENT:      Head: Normocephalic and atraumatic. Mouth/Throat:      Pharynx: No oropharyngeal exudate. Eyes:      Pupils: Pupils are equal, round, and reactive to light. Neck:      Musculoskeletal: Normal range of motion. Trachea: No tracheal deviation. Cardiovascular:      Rate and Rhythm: Normal rate and regular rhythm. Heart sounds: Normal heart sounds. No murmur. Pulmonary:      Effort: Pulmonary effort is normal. No respiratory distress. Breath sounds: Normal breath sounds. No wheezing. Abdominal:      General: Bowel sounds are normal. There is no distension. Palpations: Abdomen is soft. There is no mass. Tenderness: There is tenderness in the suprapubic area and left lower quadrant. There is no guarding or rebound. Hernia: No hernia is present. Musculoskeletal: Normal range of motion. General: No tenderness. Lymphadenopathy:      Cervical: No cervical adenopathy. Skin:     General: Skin is warm. Findings: No erythema or rash.    Neurological:      Mental Status: She is alert and oriented to person, place, and time. Psychiatric:         Behavior: Behavior normal.         ASSESSMENT and PLAN    ICD-10-CM ICD-9-CM    1. LLQ pain R10.32 789.04 XR UPPER GI/SMALL BOWEL      US ABD LTD      CANCELED: US ABD LTD     Discussed DDx and discussed and reviewed her extensive workup up to this point. No palpable hernia at the site of her pain. Will check an abdominal wall US to r/o occult hernia. Need to address the chronic constipation try adding Fiber, mirilax and probiotic    I do get the impression on the CT scan that some bowel loops are tethered in the pelvis. Will check a SBFT. F/U in the office after the studies to discussed possible exploration. I did my best to respond to each question, to her apparent satisfaction. Georgia Harding voices understanding of what we discussed and wishes to proceed with the further work up as recommended. I have asked her to call if an questions arise after she leaves the office. I spent 60 minutes with the patient > 50% of which was spent in answering the patients questions, and discussing future treatment options. Thank you for this consult.

## 2019-12-10 ENCOUNTER — HOSPITAL ENCOUNTER (OUTPATIENT)
Dept: GENERAL RADIOLOGY | Age: 59
Discharge: HOME | End: 2019-12-10
Attending: SURGERY
Payer: COMMERCIAL

## 2019-12-10 ENCOUNTER — HOSPITAL ENCOUNTER (OUTPATIENT)
Dept: ULTRASOUND IMAGING | Age: 59
Discharge: HOME | End: 2019-12-10
Attending: SURGERY
Payer: COMMERCIAL

## 2019-12-10 DIAGNOSIS — R10.32 LLQ PAIN: ICD-10-CM

## 2019-12-10 PROCEDURE — 74249 XR UPPER GI AIR CONT/SMALL BOWEL: CPT

## 2019-12-10 PROCEDURE — 76705 ECHO EXAM OF ABDOMEN: CPT

## 2019-12-11 ENCOUNTER — TELEPHONE (OUTPATIENT)
Dept: SURGERY | Age: 59
End: 2019-12-11

## 2019-12-11 NOTE — TELEPHONE ENCOUNTER
Reviewed US and SBFT    She is to come back to the office to let me know how she is doing with the  Fiber, mirilax and probiotic and to reviewed the tests and discuss a plan.

## 2019-12-12 NOTE — TELEPHONE ENCOUNTER
Informed pt results of US and UGI normal per Provider. Pt agreeable with office appointment on 12/23/19 @ 1:40pm to re-eval LLQ abdominal pain.

## 2019-12-27 DIAGNOSIS — F51.02 INSOMNIA DUE TO STRESS: ICD-10-CM

## 2019-12-29 RX ORDER — TRAZODONE HYDROCHLORIDE 100 MG/1
TABLET ORAL
Qty: 135 TAB | Refills: 3 | Status: SHIPPED | OUTPATIENT
Start: 2019-12-29 | End: 2020-03-26 | Stop reason: SDUPTHER

## 2020-03-26 DIAGNOSIS — F51.02 INSOMNIA DUE TO STRESS: ICD-10-CM

## 2020-03-26 RX ORDER — TRAZODONE HYDROCHLORIDE 100 MG/1
TABLET ORAL
Qty: 135 TAB | Refills: 2 | Status: SHIPPED | OUTPATIENT
Start: 2020-03-26 | End: 2020-12-10 | Stop reason: SDUPTHER

## 2020-06-24 ENCOUNTER — TELEPHONE (OUTPATIENT)
Dept: FAMILY MEDICINE CLINIC | Age: 60
End: 2020-06-24

## 2020-06-24 NOTE — TELEPHONE ENCOUNTER
The patient would like to complete her lab work prior to her physical appointment.  Please call [phone]

## 2020-06-25 ENCOUNTER — TELEPHONE (OUTPATIENT)
Dept: FAMILY MEDICINE CLINIC | Age: 60
End: 2020-06-25

## 2020-06-25 DIAGNOSIS — E78.00 PURE HYPERCHOLESTEROLEMIA: ICD-10-CM

## 2020-06-25 DIAGNOSIS — Z00.00 LABORATORY EXAM ORDERED AS PART OF ROUTINE GENERAL MEDICAL EXAMINATION: ICD-10-CM

## 2020-06-25 DIAGNOSIS — E78.00 PURE HYPERCHOLESTEROLEMIA: Primary | ICD-10-CM

## 2020-06-25 NOTE — TELEPHONE ENCOUNTER
Called to inform pt that Labs are not due until November. Pt states that her job is requesting her to have Labs done prior to her physical that her job is also requesting and would like to have a order put in. Advised that I will get msg to PCP. Verified understanding.

## 2020-07-09 ENCOUNTER — TELEPHONE (OUTPATIENT)
Dept: FAMILY MEDICINE CLINIC | Age: 60
End: 2020-07-09

## 2020-07-09 DIAGNOSIS — R30.0 DYSURIA: Primary | ICD-10-CM

## 2020-07-09 LAB
25(OH)D3+25(OH)D2 SERPL-MCNC: 41.4 NG/ML (ref 30–100)
ALBUMIN SERPL-MCNC: 4.7 G/DL (ref 3.8–4.9)
ALBUMIN/GLOB SERPL: 2.1 {RATIO} (ref 1.2–2.2)
ALP SERPL-CCNC: 77 IU/L (ref 39–117)
ALT SERPL-CCNC: 22 IU/L (ref 0–32)
APPEARANCE UR: ABNORMAL
AST SERPL-CCNC: 26 IU/L (ref 0–40)
BACTERIA #/AREA URNS HPF: ABNORMAL /[HPF]
BASOPHILS # BLD AUTO: 0 X10E3/UL (ref 0–0.2)
BASOPHILS NFR BLD AUTO: 1 %
BILIRUB SERPL-MCNC: 0.4 MG/DL (ref 0–1.2)
BILIRUB UR QL STRIP: NEGATIVE
BUN SERPL-MCNC: 16 MG/DL (ref 6–24)
BUN/CREAT SERPL: 17 (ref 9–23)
CALCIUM SERPL-MCNC: 10.1 MG/DL (ref 8.7–10.2)
CASTS URNS QL MICRO: ABNORMAL /LPF
CHLORIDE SERPL-SCNC: 102 MMOL/L (ref 96–106)
CHOLEST SERPL-MCNC: 237 MG/DL (ref 100–199)
CO2 SERPL-SCNC: 23 MMOL/L (ref 20–29)
COLOR UR: YELLOW
CREAT SERPL-MCNC: 0.93 MG/DL (ref 0.57–1)
CRYSTALS URNS MICRO: ABNORMAL
EOSINOPHIL # BLD AUTO: 0.1 X10E3/UL (ref 0–0.4)
EOSINOPHIL NFR BLD AUTO: 3 %
EPI CELLS #/AREA URNS HPF: ABNORMAL /HPF (ref 0–10)
ERYTHROCYTE [DISTWIDTH] IN BLOOD BY AUTOMATED COUNT: 12.1 % (ref 11.7–15.4)
GLOBULIN SER CALC-MCNC: 2.2 G/DL (ref 1.5–4.5)
GLUCOSE SERPL-MCNC: 98 MG/DL (ref 65–99)
GLUCOSE UR QL: NEGATIVE
HCT VFR BLD AUTO: 41.9 % (ref 34–46.6)
HDLC SERPL-MCNC: 64 MG/DL
HGB BLD-MCNC: 14.2 G/DL (ref 11.1–15.9)
HGB UR QL STRIP: NEGATIVE
IMM GRANULOCYTES # BLD AUTO: 0 X10E3/UL (ref 0–0.1)
IMM GRANULOCYTES NFR BLD AUTO: 0 %
INTERPRETATION, 910389: NORMAL
KETONES UR QL STRIP: ABNORMAL
LDLC SERPL CALC-MCNC: 148 MG/DL (ref 0–99)
LEUKOCYTE ESTERASE UR QL STRIP: ABNORMAL
LYMPHOCYTES # BLD AUTO: 1.1 X10E3/UL (ref 0.7–3.1)
LYMPHOCYTES NFR BLD AUTO: 34 %
MCH RBC QN AUTO: 30.5 PG (ref 26.6–33)
MCHC RBC AUTO-ENTMCNC: 33.9 G/DL (ref 31.5–35.7)
MCV RBC AUTO: 90 FL (ref 79–97)
MICRO URNS: ABNORMAL
MONOCYTES # BLD AUTO: 0.3 X10E3/UL (ref 0.1–0.9)
MONOCYTES NFR BLD AUTO: 8 %
MUCOUS THREADS URNS QL MICRO: PRESENT
NEUTROPHILS # BLD AUTO: 1.7 X10E3/UL (ref 1.4–7)
NEUTROPHILS NFR BLD AUTO: 54 %
NITRITE UR QL STRIP: NEGATIVE
PH UR STRIP: 6.5 [PH] (ref 5–7.5)
PLATELET # BLD AUTO: 199 X10E3/UL (ref 150–450)
POTASSIUM SERPL-SCNC: 4.3 MMOL/L (ref 3.5–5.2)
PROT SERPL-MCNC: 6.9 G/DL (ref 6–8.5)
PROT UR QL STRIP: NEGATIVE
RBC # BLD AUTO: 4.65 X10E6/UL (ref 3.77–5.28)
RBC #/AREA URNS HPF: ABNORMAL /HPF (ref 0–2)
SODIUM SERPL-SCNC: 141 MMOL/L (ref 134–144)
SP GR UR: 1.02 (ref 1–1.03)
TRIGL SERPL-MCNC: 123 MG/DL (ref 0–149)
TSH SERPL DL<=0.005 MIU/L-ACNC: 1.43 UIU/ML (ref 0.45–4.5)
UNIDENT CRYS URNS QL MICRO: PRESENT
UROBILINOGEN UR STRIP-MCNC: 0.2 MG/DL (ref 0.2–1)
VLDLC SERPL CALC-MCNC: 25 MG/DL (ref 5–40)
WBC # BLD AUTO: 3.2 X10E3/UL (ref 3.4–10.8)
WBC #/AREA URNS HPF: ABNORMAL /HPF (ref 0–5)
YEAST #/AREA URNS HPF: PRESENT /[HPF]

## 2020-07-09 NOTE — TELEPHONE ENCOUNTER
The patient returned your phone call and would like a phone call back as soon as possible.  Please call [phone]

## 2020-07-11 LAB — BACTERIA UR CULT: NORMAL

## 2020-12-10 DIAGNOSIS — F51.02 INSOMNIA DUE TO STRESS: ICD-10-CM

## 2020-12-10 RX ORDER — TRAZODONE HYDROCHLORIDE 100 MG/1
TABLET ORAL
Qty: 135 TAB | Refills: 0 | Status: SHIPPED | OUTPATIENT
Start: 2020-12-10 | End: 2021-02-12 | Stop reason: SDUPTHER

## 2020-12-10 RX ORDER — LANOLIN ALCOHOL/MO/W.PET/CERES
1000 CREAM (GRAM) TOPICAL DAILY
COMMUNITY

## 2020-12-10 RX ORDER — TRAMADOL HYDROCHLORIDE 50 MG/1
50 TABLET ORAL
COMMUNITY
Start: 2020-05-05 | End: 2020-12-17

## 2020-12-10 RX ORDER — MELOXICAM 15 MG/1
15 TABLET ORAL DAILY
COMMUNITY
Start: 2020-03-04 | End: 2020-12-17

## 2020-12-17 ENCOUNTER — OFFICE VISIT (OUTPATIENT)
Dept: URGENT CARE | Age: 60
End: 2020-12-17
Payer: COMMERCIAL

## 2020-12-17 VITALS — HEART RATE: 98 BPM | TEMPERATURE: 98 F | RESPIRATION RATE: 16 BRPM | OXYGEN SATURATION: 98 %

## 2020-12-17 DIAGNOSIS — Z20.822 EXPOSURE TO COVID-19 VIRUS: Primary | ICD-10-CM

## 2020-12-17 PROCEDURE — 99213 OFFICE O/P EST LOW 20 MIN: CPT | Performed by: FAMILY MEDICINE

## 2020-12-17 NOTE — PROGRESS NOTES
This patient was seen at [address] [address] Urgent Care while in their vehicle due to COVID-19 pandemic with PPE and focused examination in order to decrease community viral transmission. The patient/guardian gave verbal consent to treat. Sraah Beth Borjas is a 63 y.o. female who presents for COVID-19 testing. Was exposed to COVID-19 by friend, last contact 5 days ago. Denies cough, fever, SOB. The history is provided by the patient.         Past Medical History:   Diagnosis Date    Atypical chest pain 1/7/10    Constipation, chronic     chronic issue but 7/10/15 [facility]  17 f/u note from [facility] dermatitis and other eczema, due to unspecified cause     Diverticula of colon     Estrogen deficiency 2009    H/O cold sores     Hypercholesterolemia     Insomnia     Nausea & vomiting     Other pyelonephritis or pyonephrosis, not specified as acute or chronic 1980    Pharyngitis 10/4/15    [facility]    Psychiatric disorder     anxiety    Radius fracture 2013    left wrist @ [facility] then to TOSS        Past Surgical History:   Procedure Laterality Date    HX  SECTION      X1    HX DILATION AND CURETTAGE      X2    HX ENDOSCOPY  2017    [facility]--path rec'd and normal mucosa    HX GYN  06    RICHARD BSO    HX HYSTERECTOMY      HX ORTHOPAEDIC  05    L knee ant cruc repair    HX OTHER SURGICAL      exploratory lap    I&D ABCESS COMP/MULTIPLE  11 [facility] er    [place] COLONOSCOPY FLX DX W/COLLJ SPEC WHEN PFRMD  2011              Family History   Problem Relation Age of Onset    Hypertension Mother     Thyroid Disease Mother     Heart Disease Maternal Grandmother     Diabetes Maternal Grandmother     Hypertension Brother         Social History     Socioeconomic History    Marital status:      Spouse name: Not on file    Number of children: Not on file    Years of education: Not on file    Highest education level: Not on file Occupational History    Not on file   Social Needs    Financial resource strain: Not on file    Food insecurity     Worry: Not on file     Inability: Not on file    Transportation needs     Medical: Not on file     Non-medical: Not on file   Tobacco Use    Smoking status: Never Smoker    Smokeless tobacco: Never Used   Substance and Sexual Activity    Alcohol use: Yes     Alcohol/week: 0.0 standard drinks     Comment: 3 drinks/week    Drug use: No     Types: Prescription, OTC    Sexual activity: Yes     Partners: Male   Lifestyle    Physical activity     Days per week: Not on file     Minutes per session: Not on file    Stress: Not on file   Relationships    Social connections     Talks on phone: Not on file     Gets together: Not on file     Attends Rastafarian service: Not on file     Active member of club or organization: Not on file     Attends meetings of clubs or organizations: Not on file     Relationship status: Not on file    Intimate partner violence     Fear of current or ex partner: Not on file     Emotionally abused: Not on file     Physically abused: Not on file     Forced sexual activity: Not on file   Other Topics Concern    Not on file   Social History Narrative    Not on file                ALLERGIES: Codeine, Pcn [penicillins], and Sulfa (sulfonamide antibiotics)    Review of Systems   Constitutional: Negative for activity change, appetite change, chills and fever. HENT: Negative for congestion, rhinorrhea and sore throat. Respiratory: Negative for cough, shortness of breath and wheezing. Cardiovascular: Negative for chest pain. Gastrointestinal: Negative for abdominal pain, diarrhea, nausea and vomiting. Musculoskeletal: Negative for myalgias. Neurological: Negative for headaches. Vitals:    12/17/20 0827   Pulse: 98   Resp: 16   Temp: 98 °F (36.7 °C)   SpO2: 98%       Physical Exam  Vitals signs and nursing note reviewed.    Constitutional:       General: She is not in acute distress. Appearance: She is well-developed. She is not diaphoretic. Pulmonary:      Effort: Pulmonary effort is normal. No respiratory distress. Breath sounds: Normal breath sounds. No stridor. No wheezing, rhonchi or rales. Neurological:      Mental Status: She is alert. Psychiatric:         Behavior: Behavior normal.         Thought Content: Thought content normal.         Judgment: Judgment normal.         MDM    ICD-10-CM ICD-9-CM   1. Exposure to COVID-19 virus  Z20.828 V01.79       Orders Placed This Encounter    NOVEL CORONAVIRUS (COVID-19)     Scheduling Instructions:      1) Due to current limited availability of the COVID-19 PCR test, tests will be prioritized and may not be completed.              2) Order only if the test result will change clinical management or necessary for a return to mission-critical employment decision.              3) Print and instruct patient to adhere to CDC home isolation program. (Link Above)              4) Set up or refer patient for a monitoring program.              5) Have patient sign up for and leverage [employer] (if not previously done). Order Specific Question:   Is this test for diagnosis or screening? Answer:   Screening     Order Specific Question:   Symptomatic for COVID-19 as defined by CDC? Answer:   No     Order Specific Question:   Hospitalized for COVID-19? Answer:   No     Order Specific Question:   Admitted to ICU for COVID-19? Answer:   No     Order Specific Question:   Employed in healthcare setting? Answer:   No     Order Specific Question:   Resident in a congregate (group) care setting? Answer:   No     Order Specific Question:   Pregnant? Answer:   No     Order Specific Question:   Previously tested for COVID-19? Answer:   No        Quarantine  Deep breathing exercises, ambulation      If signs and symptoms become worse the pt is to go to the ER.          Procedures

## 2020-12-17 NOTE — LETTER
December 17, 2020 Georgia Restrepo [address] [place] [address] [id number] Dear Felicita Robbins: Thank you for requesting access to [employer]. Please follow the instructions below to securely access and download your online medical record. [employer] allows you to send messages to your doctor, view your test results, renew your prescriptions, schedule appointments, and more. How Do I Sign Up? 1. In your internet browser, go to [email or web address] "[employer]. 2. Click on the First Time User? Click Here link in the Sign In box. You will see the New Member Sign Up page. 3. Enter your [employer] Access Code exactly as it appears below. You will not need to use this code after youve completed the sign-up process. If you do not sign up before the expiration date, you must request a new code. [employer] Access Code: [email or web address] Expires: 1/31/2021  8:16 AM  
 
4. Enter the last four digits of your Social Security Number (xxxx) and Date of Birth (mm/dd/yyyy) as indicated and click Submit. You will be taken to the next sign-up page. 5. Create a SocialThreader ID. This will be your [employer]er login ID and cannot be changed, so think of one that is secure and easy to remember. 6. Create a [employer] password. You can change your password at any time. 7. Enter your Password Reset Question and Answer. This can be used at a later time if you forget your password. 8. Enter your e-mail address. You will receive e-mail notification when new information is available in [address]. 9. Click Sign Up. You can now view and download portions of your medical record. 10. Click the Download Summary menu link to download a portable copy of your medical information. Additional Information If you have questions, please visit the Frequently Asked Questions section of the [employer] website at [email or web address] "[employer]/. Remember, [employer] is NOT to be used for urgent needs. For medical emergencies, dial 911. Now available from your iPhone and Android! Sincerely, [employer]

## 2020-12-19 LAB — SARS-COV-2, NAA: NOT DETECTED

## 2021-02-12 DIAGNOSIS — F51.02 INSOMNIA DUE TO STRESS: ICD-10-CM

## 2021-02-12 RX ORDER — TRAZODONE HYDROCHLORIDE 100 MG/1
TABLET ORAL
Qty: 135 TAB | Refills: 0 | Status: SHIPPED | OUTPATIENT
Start: 2021-02-12 | End: 2021-05-11

## 2021-02-12 NOTE — TELEPHONE ENCOUNTER
Last visit 11/20/2019 MD Read   Next appointment Nothing scheduled   Previous refill encounter(s)   12/10/2020 Desyrel #[id number]     Requested Prescriptions     Pending Prescriptions Disp Refills    traZODone (DESYREL) 100 mg tablet 135 Tab 0     Sig: TAKE 1 TO 1 AND 1/2 TABLETS BY MOUTH NIGHTLY AT BEDTIME  Indications: insomnia associated with depression

## 2021-05-06 DIAGNOSIS — F51.02 INSOMNIA DUE TO STRESS: ICD-10-CM

## 2021-05-06 RX ORDER — TRAZODONE HYDROCHLORIDE 100 MG/1
TABLET ORAL
Qty: 135 TAB | Refills: 0 | Status: CANCELLED | OUTPATIENT
Start: 2021-05-06

## 2021-06-07 ENCOUNTER — OFFICE VISIT (OUTPATIENT)
Dept: INTERNAL MEDICINE CLINIC | Age: 61
End: 2021-06-07
Payer: COMMERCIAL

## 2021-06-07 VITALS
DIASTOLIC BLOOD PRESSURE: 70 MMHG | TEMPERATURE: 97.9 F | RESPIRATION RATE: 14 BRPM | HEART RATE: 101 BPM | BODY MASS INDEX: 27.48 KG/M2 | WEIGHT: 171 LBS | HEIGHT: 66 IN | OXYGEN SATURATION: 97 % | SYSTOLIC BLOOD PRESSURE: 106 MMHG

## 2021-06-07 DIAGNOSIS — Z00.00 LABORATORY EXAM ORDERED AS PART OF ROUTINE GENERAL MEDICAL EXAMINATION: ICD-10-CM

## 2021-06-07 DIAGNOSIS — Z12.11 ENCOUNTER FOR COLORECTAL CANCER SCREENING: ICD-10-CM

## 2021-06-07 DIAGNOSIS — R73.02 IMPAIRED GLUCOSE TOLERANCE: ICD-10-CM

## 2021-06-07 DIAGNOSIS — E78.00 PURE HYPERCHOLESTEROLEMIA: ICD-10-CM

## 2021-06-07 DIAGNOSIS — K59.00 CONSTIPATION, UNSPECIFIED CONSTIPATION TYPE: ICD-10-CM

## 2021-06-07 DIAGNOSIS — Z12.12 ENCOUNTER FOR COLORECTAL CANCER SCREENING: ICD-10-CM

## 2021-06-07 DIAGNOSIS — Z00.00 WELL WOMAN EXAM (NO GYNECOLOGICAL EXAM): ICD-10-CM

## 2021-06-07 DIAGNOSIS — Z00.00 ENCOUNTER FOR ANNUAL PHYSICAL EXAM: Primary | ICD-10-CM

## 2021-06-07 DIAGNOSIS — Z12.31 ENCOUNTER FOR SCREENING MAMMOGRAM FOR BREAST CANCER: ICD-10-CM

## 2021-06-07 DIAGNOSIS — Z11.59 NEED FOR HEPATITIS C SCREENING TEST: ICD-10-CM

## 2021-06-07 DIAGNOSIS — F51.02 INSOMNIA DUE TO STRESS: ICD-10-CM

## 2021-06-07 LAB
25(OH)D3 SERPL-MCNC: 28.8 NG/ML (ref 30–100)
ALBUMIN SERPL-MCNC: 4.2 G/DL (ref 3.5–5)
ALBUMIN/GLOB SERPL: 1.3 {RATIO} (ref 1.1–2.2)
ALP SERPL-CCNC: 101 U/L (ref 45–117)
ALT SERPL-CCNC: 23 U/L (ref 12–78)
ANION GAP SERPL CALC-SCNC: 4 MMOL/L (ref 5–15)
AST SERPL-CCNC: 26 U/L (ref 15–37)
BILIRUB SERPL-MCNC: 0.4 MG/DL (ref 0.2–1)
BUN SERPL-MCNC: 17 MG/DL (ref 6–20)
BUN/CREAT SERPL: 22 (ref 12–20)
CALCIUM SERPL-MCNC: 9.6 MG/DL (ref 8.5–10.1)
CHLORIDE SERPL-SCNC: 106 MMOL/L (ref 97–108)
CHOLEST SERPL-MCNC: 280 MG/DL
CO2 SERPL-SCNC: 28 MMOL/L (ref 21–32)
CREAT SERPL-MCNC: 0.76 MG/DL (ref 0.55–1.02)
ERYTHROCYTE [DISTWIDTH] IN BLOOD BY AUTOMATED COUNT: 11.6 % (ref 11.5–14.5)
EST. AVERAGE GLUCOSE BLD GHB EST-MCNC: 111 MG/DL
GLOBULIN SER CALC-MCNC: 3.2 G/DL (ref 2–4)
GLUCOSE SERPL-MCNC: 114 MG/DL (ref 65–100)
HBA1C MFR BLD: 5.5 % (ref 4–5.6)
HCT VFR BLD AUTO: 40.5 % (ref 35–47)
HDLC SERPL-MCNC: 73 MG/DL
HDLC SERPL: 3.8 {RATIO} (ref 0–5)
HGB BLD-MCNC: 13.2 G/DL (ref 11.5–16)
LDLC SERPL CALC-MCNC: 157.8 MG/DL (ref 0–100)
MCH RBC QN AUTO: 30.3 PG (ref 26–34)
MCHC RBC AUTO-ENTMCNC: 32.6 G/DL (ref 30–36.5)
MCV RBC AUTO: 92.9 FL (ref 80–99)
NRBC # BLD: 0 K/UL (ref 0–0.01)
NRBC BLD-RTO: 0 PER 100 WBC
PLATELET # BLD AUTO: 207 K/UL (ref 150–400)
PMV BLD AUTO: 12.2 FL (ref 8.9–12.9)
POTASSIUM SERPL-SCNC: 4.4 MMOL/L (ref 3.5–5.1)
PROT SERPL-MCNC: 7.4 G/DL (ref 6.4–8.2)
RBC # BLD AUTO: 4.36 M/UL (ref 3.8–5.2)
SODIUM SERPL-SCNC: 138 MMOL/L (ref 136–145)
TRIGL SERPL-MCNC: 246 MG/DL (ref ?–150)
TSH SERPL DL<=0.05 MIU/L-ACNC: 0.86 UIU/ML (ref 0.36–3.74)
VLDLC SERPL CALC-MCNC: 49.2 MG/DL
WBC # BLD AUTO: 4.3 K/UL (ref 3.6–11)

## 2021-06-07 PROCEDURE — 99396 PREV VISIT EST AGE 40-64: CPT | Performed by: INTERNAL MEDICINE

## 2021-06-07 RX ORDER — FAMCICLOVIR 500 MG/1
TABLET ORAL
COMMUNITY
Start: 2021-05-04

## 2021-06-07 RX ORDER — TRAZODONE HYDROCHLORIDE 100 MG/1
TABLET ORAL
Qty: 45 TABLET | Refills: 0 | Status: SHIPPED | OUTPATIENT
Start: 2021-06-07 | End: 2021-07-13 | Stop reason: SDUPTHER

## 2021-06-07 NOTE — PROGRESS NOTES
Juanito Wyatt is a 62 y.o. female presenting for Establish Care  . 1. Have you been to the ER, urgent care clinic since your last visit? Hospitalized since your last visit? No    2. Have you seen or consulted any other health care providers outside of the [address] [place] [address] since your last visit? Include any pap smears or colon screening. No    Fall Risk Assessment, last 12 mths 6/7/2021   Able to walk? Yes   Fall in past 12 months? 0   Do you feel unsteady? 0   Are you worried about falling 0         Abuse Screening Questionnaire 6/7/2021   Do you ever feel afraid of your partner? N   Are you in a relationship with someone who physically or mentally threatens you? N   Is it safe for you to go home? Y       3 most recent PHQ Screens 6/7/2021   Little interest or pleasure in doing things Not at all   Feeling down, depressed, irritable, or hopeless Not at all   Total Score PHQ 2 0   Trouble falling or staying asleep, or sleeping too much -   Feeling tired or having little energy -   Poor appetite, weight loss, or overeating -   Feeling bad about yourself - or that you are a failure or have let yourself or your family down -   Trouble concentrating on things such as school, work, reading, or watching TV -   Moving or speaking so slowly that other people could have noticed; or the opposite being so fidgety that others notice -   Thoughts of being better off dead, or hurting yourself in some way -   PHQ 9 Score -   How difficult have these problems made it for you to do your work, take care of your home and get along with others -       There are no discontinued medications.

## 2021-06-07 NOTE — PROGRESS NOTES
Josue Paz is a 62 y.o. female and presents with Establish Care      Subjective:  Patient comes in today to establish care. She is a new patient to the practice. Former patient of Dr. Kaitlyn Angulo. She works in [employer]. She has 2 twin daughters 25years old. Recently got  in 2018. Patient follows up with [facility] Dr. Patience Dickens. She has a history of total abdominal hysterectomy and BSO. Denies any history of malignancy in the family. Is up-to-date on mammograms. Last mammogram was in  which was negative for malignancy. DEXA was 10 years back which was normal.  She has a history of chronic idiopathic constipation she is on Trulance. Follows up with Dr. Binta Mensah.  Last colonoscopy was in . 5-year repeat. She has a history of precancerous lesion on her left leg. Follows up with every 3 months. History of insomniaon trazodone.     Past Medical History:   Diagnosis Date    Atypical chest pain 1/7/10    Constipation, chronic     chronic issue but 7/10/15 [facility] PF  17 f/u note from [facility] dermatitis and other eczema, due to unspecified cause     Diverticula of colon     Estrogen deficiency 2009    H/O cold sores     Hypercholesterolemia     Insomnia     Nausea & vomiting     Other pyelonephritis or pyonephrosis, not specified as acute or chronic 1980    Pharyngitis 10/4/15    [facility]    Psychiatric disorder     anxiety    Radius fracture 2013    left wrist @ [facility] then to TOSS     Past Surgical History:   Procedure Laterality Date    HX  SECTION      X1    HX DILATION AND CURETTAGE      X2    HX ENDOSCOPY  2017    Kailee--path rec'd and normal mucosa    HX GYN  06    RICHARD BSO    HX HYSTERECTOMY      HX ORTHOPAEDIC  05    L knee ant cruc repair    HX OTHER SURGICAL      exploratory lap    [facility]    NM COLONOSCOPY FLX DX W/COLLJ SPEC WHEN PFRMD  4/1/2011          Allergies   Allergen Reactions    Codeine Nausea and Vomiting    Pcn [Penicillins] Hives    Sulfa (Sulfonamide Antibiotics) Hives     Current Outpatient Medications   Medication Sig Dispense Refill    famciclovir (FAMVIR) 500 mg tablet TAKE 3 TABLETS BY MOUTH ONCE WHEN SYMPTOMS OCCUE      traZODone (DESYREL) 100 mg tablet TAKE 1 TO 1 AND 1/2 TABLETS BY MOUTH NIGHTLY AT BEDTIME. [facility] IS CLOSING. PLEASE ESTABLISH WITH A NEW PRACTICE FOR FUTURE REFILLS. 45 Tablet 0    cyanocobalamin 1,000 mcg tablet Take 1,000 mcg by mouth daily.  TRULANCE 3 mg tab Takes PRN  3     Social History     Socioeconomic History    Marital status:      Spouse name: Not on file    Number of children: Not on file    Years of education: Not on file    Highest education level: Not on file   Tobacco Use    Smoking status: Never Smoker    Smokeless tobacco: Never Used   Vaping Use    Vaping Use: Never used   Substance and Sexual Activity    Alcohol use: Yes     Alcohol/week: 0.0 standard drinks     Comment: 3 drinks/week    Drug use: No     Types: Prescription, OTC    Sexual activity: Yes     Partners: Male     Social Determinants of Health     Financial Resource Strain:     Difficulty of Paying Living Expenses:    Food Insecurity:     Worried About Running Out of Food in the Last Year:     [address] in the Last Year:    Transportation Needs:     Lack of Transportation (Medical):      Lack of Transportation (Non-Medical):    Physical Activity:     Days of Exercise per Week:     Minutes of Exercise per Session:    Stress:     Feeling of Stress :    Social Connections:     Frequency of Communication with Friends and Family:     Frequency of Social Gatherings with Friends and Family:     Attends Sabianist Services:     Active Member of Clubs or Organizations:     Attends Club or Organization Meetings:     Marital Status:      Family History   Problem Relation Age of Onset    Hypertension Mother     Thyroid Disease Mother     Heart Disease Maternal Grandmother     Diabetes Maternal Grandmother     Hypertension Brother        Objective:  Visit Vitals  /70 (BP 1 Location: Left upper arm, BP Patient Position: Sitting, BP Cuff Size: Adult)   Pulse (!) 101   Temp 97.9 °F (36.6 °C)   Resp 14   Ht 5' 5.5\" (1.664 m)   Wt 171 lb (77.6 kg)   SpO2 97%   BMI 28.02 kg/m²     Physical Exam:   General appearance - alert, well appearing, and in no distress  Mental status - alert, oriented to person, place, and time  EYE-JUNIE, EOMI, fundi normal, corneas normal, no foreign bodies  ENT-ENT exam normal, no neck nodes or sinus tenderness  Nose - normal and patent, no erythema, discharge or polyps  Mouth - mucous membranes moist, pharynx normal without lesions  Neck - supple, no significant adenopathy   Chest - clear to auscultation, no wheezes, rales or rhonchi, symmetric air entry   Heart - normal rate, regular rhythm, normal S1, S2, no murmurs, rubs, clicks or gallops   Abdomen - soft, nontender, nondistended, no masses or organomegaly  Lymph- no adenopathy palpable  Ext-peripheral pulses normal, no pedal edema, no clubbing or cyanosis  Skin-Warm and dry. no hyperpigmentation, vitiligo, or suspicious lesions  Neuro -alert, oriented, normal speech, no focal findings or movement disorder noted  Musculoskeletal- FROM, no bony abnormalities, no point tenderness    Lab Review:  Results for orders placed or performed in visit on 12/17/20   NOVEL CORONAVIRUS (COVID-19)   Result Value Ref Range    SARS-CoV-2, PRACHI Not Detected Not Detected        Documenation Review:    Assessment/Plan:    Diagnoses and all orders for this visit:    1. Encounter for annual physical exam    2. Constipation, unspecified constipation type    3. Pure hypercholesterolemia  -     LIPID PANEL; Future    4. Encounter for colorectal cancer screening    5.  Encounter for screening mammogram for breast cancer    6. Need for hepatitis C screening test    7. Well woman exam (no gynecological exam)    8. Laboratory exam ordered as part of routine general medical examination  -     CBC W/O DIFF; Future  -     METABOLIC PANEL, COMPREHENSIVE; Future  -     TSH 3RD GENERATION; Future  -     VITAMIN D, 25 HYDROXY; Future    9. Impaired glucose tolerance  -     HEMOGLOBIN A1C WITH EAG; Future    10. Insomnia due to stress  -     traZODone (DESYREL) 100 mg tablet; TAKE 1 TO 1 AND 1/2 TABLETS BY MOUTH NIGHTLY AT BEDTIME. [facility] IS CLOSING. PLEASE ESTABLISH WITH A NEW PRACTICE FOR FUTURE REFILLS. Preventive services:  Test      Status  Mammogram               2020, negative for malignancy, [facility]. Dr. Parmjit Bender. Pap smear    S/p RICHARD BSO  Colon cancer screening  2019history of polyp. 5-year repeat. Low-dose lung CT   not applicable  Bone density/DEXA   2010normal  Diabetes (glucose/HbA1c)  order placed  Cardiovascular (lipids)  order placed  Hepatitis C    negative  Visual acuity    current    Patient is up-to-date on all immunization and health screening. She did get a COVID-19 vaccine 2021. Continue current meds. I will call with lab results and make further recommendations or adjustments if necessary. Discussed lifestyle modifications including Na restriction, low carb/fat diet, weight reduction and exercise (at least a walking program). ICD-10-CM ICD-9-CM    1. Encounter for annual physical exam  Z00.00 V70.0    2. Constipation, unspecified constipation type  K59.00 564.00    3. Pure hypercholesterolemia  E78.00 272.0 LIPID PANEL      LIPID PANEL   4. Encounter for colorectal cancer screening  Z12.11 V76.51     Z12.12 V76.41    5. Encounter for screening mammogram for breast cancer  Z12.31 V76.12    6. Need for hepatitis C screening test  Z11.59 V73.89    7. Well woman exam (no gynecological exam)  Z00.00 V70.0    8.  Laboratory exam ordered as part of routine general medical examination  Z00.00 V72.62 CBC W/O DIFF      METABOLIC PANEL, COMPREHENSIVE      TSH 3RD GENERATION      VITAMIN D, 25 HYDROXY      VITAMIN D, 25 HYDROXY      TSH 3RD GENERATION      METABOLIC PANEL, COMPREHENSIVE      CBC W/O DIFF   9. Impaired glucose tolerance  R73.02 790.22 HEMOGLOBIN A1C WITH EAG      HEMOGLOBIN A1C WITH EAG   10. Insomnia due to stress  F51.02 307.41 traZODone (DESYREL) 100 mg tablet               I have reviewed with the patient details of the assessment and plan and all questions were answered. Relevent patient education was performed. Verbal and/or written instructions (see AVS) provided. The most recent lab findings were reviewed with the patient. Plan was discussed with patient who verbally expressed understanding. An After Visit Summary was printed and given to the patient.     Shon Dancer, MD

## 2021-06-07 NOTE — PATIENT INSTRUCTIONS
Starting a Weight Loss Plan: Care Instructions Overview If you're thinking about losing weight, it can be hard to know where to start. Your doctor can help you set up a weight loss plan that best meets your needs. You may want to take a class on nutrition or exercise, or you could join a weight loss support group. If you have questions about how to make changes to your eating or exercise habits, ask your doctor about seeing a registered dietitian or an exercise specialist. 
It can be a big challenge to lose weight. But you don't have to make huge changes at once. Make small changes, and stick with them. When those changes become habit, add a few more changes. If you don't think you're ready to make changes right now, try to pick a date in the future. Make an appointment to see your doctor to discuss whether the time is right for you to start a plan. Follow-up care is a key part of your treatment and safety. Be sure to make and go to all appointments, and call your doctor if you are having problems. It's also a good idea to know your test results and keep a list of the medicines you take. How can you care for yourself at home? · Set realistic goals. Many people expect to lose much more weight than is likely. A weight loss of 5% to 10% of your body weight may be enough to improve your health. · Get family and friends involved to provide support. Talk to them about why you are trying to lose weight, and ask them to help. They can help by participating in exercise and having meals with you, even if they may be eating something different. · Find what works best for you. If you do not have time or do not like to cook, a program that offers meal replacement bars or shakes may be better for you. Or if you like to prepare meals, finding a plan that includes daily menus and recipes may be best. 
· Ask your doctor about other health professionals who can help you achieve your weight loss goals.  
? A dietitian can help you make healthy changes in your diet. ? An exercise specialist or  can help you develop a safe and effective exercise program. 
? A counselor or psychiatrist can help you cope with issues such as depression, anxiety, or family problems that can make it hard to focus on weight loss. · Consider joining a support group for people who are trying to lose weight. Your doctor can suggest groups in your area. Where can you learn more? Go to http://www.gray.com/ Enter R839 in the search box to learn more about \"Starting a Weight Loss Plan: Care Instructions. \" Current as of: September 23, 2020               Content Version: 12.8 © 1201-8012 Healthwise, [employer]. Care instructions adapted under license by [employer] (which disclaims liability or warranty for this information). If you have questions about a medical condition or this instruction, always ask your healthcare professional. [address] any warranty or liability for your use of this information.

## 2021-07-13 DIAGNOSIS — F51.02 INSOMNIA DUE TO STRESS: ICD-10-CM

## 2021-07-13 RX ORDER — TRAZODONE HYDROCHLORIDE 100 MG/1
TABLET ORAL
Qty: 45 TABLET | Refills: 0 | Status: SHIPPED | OUTPATIENT
Start: 2021-07-13 | End: 2021-08-05 | Stop reason: SDUPTHER

## 2021-07-13 NOTE — TELEPHONE ENCOUNTER
Last Refill: 6/7/2021  Last visit:6/7/2021    NOV: Visit date not found    Requested Prescriptions     Pending Prescriptions Disp Refills    traZODone (DESYREL) 100 mg tablet 45 Tablet 0     Sig: TAKE 1 TO 1 AND 1/2 TABLETS BY MOUTH NIGHTLY AT BEDTIME. [facility] IS CLOSING. PLEASE ESTABLISH WITH A NEW PRACTICE FOR FUTURE REFILLS.

## 2021-08-04 DIAGNOSIS — F51.02 INSOMNIA DUE TO STRESS: ICD-10-CM

## 2021-08-05 DIAGNOSIS — F51.02 INSOMNIA DUE TO STRESS: ICD-10-CM

## 2021-08-05 RX ORDER — TRAZODONE HYDROCHLORIDE 100 MG/1
TABLET ORAL
Qty: 45 TABLET | Refills: 0 | Status: SHIPPED | OUTPATIENT
Start: 2021-08-05 | End: 2021-11-04 | Stop reason: SDUPTHER

## 2021-08-05 RX ORDER — TRAZODONE HYDROCHLORIDE 100 MG/1
TABLET ORAL
Qty: 45 TABLET | Refills: 0 | Status: SHIPPED | OUTPATIENT
Start: 2021-08-05 | End: 2021-10-22 | Stop reason: SDUPTHER

## 2021-08-05 NOTE — TELEPHONE ENCOUNTER
PCP: Donn Fernando MD    Last appt: 6/7/2021  Future Appointments   Date Time Provider Jairo Gilbert   6/8/2022  8:30 AM Yassine Arce MD [facility]       Requested Prescriptions     Pending Prescriptions Disp Refills    traZODone (DESYREL) 100 mg tablet 45 Tablet 0     Sig: TAKE 1 TO 1 AND 1/2 TABLETS BY MOUTH NIGHTLY AT BEDTIME. [facility] IS CLOSING. PLEASE ESTABLISH WITH A NEW PRACTICE FOR FUTURE REFILLS.        Prior labs and Blood pressures:  BP Readings from Last 3 Encounters:   06/07/21 106/70   11/25/19 136/90   11/20/19 111/75     Lab Results   Component Value Date/Time    Sodium 138 06/07/2021 09:11 AM    Potassium 4.4 06/07/2021 09:11 AM    Chloride 106 06/07/2021 09:11 AM    CO2 28 06/07/2021 09:11 AM    Anion gap 4 (L) 06/07/2021 09:11 AM    Glucose 114 (H) 06/07/2021 09:11 AM    BUN 17 06/07/2021 09:11 AM    Creatinine 0.76 06/07/2021 09:11 AM    BUN/Creatinine ratio 22 (H) 06/07/2021 09:11 AM    GFR est AA >60 06/07/2021 09:11 AM    GFR est non-AA >60 06/07/2021 09:11 AM    Calcium 9.6 06/07/2021 09:11 AM     Lab Results   Component Value Date/Time    Hemoglobin A1c 5.5 06/07/2021 09:11 AM     Lab Results   Component Value Date/Time    Cholesterol, total 280 (H) 06/07/2021 09:11 AM    HDL Cholesterol 73 06/07/2021 09:11 AM    LDL, calculated 157.8 (H) 06/07/2021 09:11 AM    VLDL, calculated 49.2 06/07/2021 09:11 AM    Triglyceride 246 (H) 06/07/2021 09:11 AM    CHOL/HDL Ratio 3.8 06/07/2021 09:11 AM     Lab Results   Component Value Date/Time    Vitamin D 25-Hydroxy 28.8 (L) 06/07/2021 09:11 AM       Lab Results   Component Value Date/Time    TSH 0.86 06/07/2021 09:11 AM

## 2021-10-22 DIAGNOSIS — F51.02 INSOMNIA DUE TO STRESS: ICD-10-CM

## 2021-10-22 RX ORDER — TRAZODONE HYDROCHLORIDE 100 MG/1
TABLET ORAL
Qty: 45 TABLET | Refills: 0 | Status: SHIPPED | OUTPATIENT
Start: 2021-10-22 | End: 2021-11-04 | Stop reason: SDUPTHER

## 2021-10-22 NOTE — TELEPHONE ENCOUNTER
Last Refill: 08/05/2021  Last Visit: Visit date not found   Next Visit: 11/4/2021    Requested Prescriptions     Pending Prescriptions Disp Refills    traZODone (DESYREL) 100 mg tablet 45 Tablet 0     Sig: TAKE 1 TO 1 AND 1/2 TABLETS BY MOUTH NIGHTLY AT BEDTIME. [facility] IS CLOSING. PLEASE ESTABLISH WITH A NEW PRACTICE FOR FUTURE REFILLS.

## 2021-11-04 ENCOUNTER — OFFICE VISIT (OUTPATIENT)
Dept: INTERNAL MEDICINE CLINIC | Age: 61
End: 2021-11-04
Payer: COMMERCIAL

## 2021-11-04 VITALS
OXYGEN SATURATION: 97 % | TEMPERATURE: 97.5 F | BODY MASS INDEX: 30.79 KG/M2 | RESPIRATION RATE: 16 BRPM | DIASTOLIC BLOOD PRESSURE: 68 MMHG | SYSTOLIC BLOOD PRESSURE: 110 MMHG | WEIGHT: 184.8 LBS | HEART RATE: 85 BPM | HEIGHT: 65 IN

## 2021-11-04 DIAGNOSIS — E66.09 CLASS 1 OBESITY DUE TO EXCESS CALORIES WITH SERIOUS COMORBIDITY AND BODY MASS INDEX (BMI) OF 30.0 TO 30.9 IN ADULT: ICD-10-CM

## 2021-11-04 DIAGNOSIS — F51.02 INSOMNIA DUE TO STRESS: Primary | ICD-10-CM

## 2021-11-04 DIAGNOSIS — E78.00 PURE HYPERCHOLESTEROLEMIA: ICD-10-CM

## 2021-11-04 DIAGNOSIS — M17.0 PRIMARY OSTEOARTHRITIS OF BOTH KNEES: ICD-10-CM

## 2021-11-04 DIAGNOSIS — R73.03 PREDIABETES: ICD-10-CM

## 2021-11-04 DIAGNOSIS — R73.02 IMPAIRED GLUCOSE TOLERANCE: ICD-10-CM

## 2021-11-04 PROCEDURE — 99213 OFFICE O/P EST LOW 20 MIN: CPT | Performed by: PHYSICIAN ASSISTANT

## 2021-11-04 RX ORDER — TRAZODONE HYDROCHLORIDE 100 MG/1
TABLET ORAL
Qty: 135 TABLET | Refills: 1 | Status: SHIPPED | OUTPATIENT
Start: 2021-11-04 | End: 2022-05-01

## 2021-11-04 NOTE — PATIENT INSTRUCTIONS
High Cholesterol: Care Instructions Overview Cholesterol is a type of fat in your blood. It is needed for many body functions, such as making new cells. Cholesterol is made by your body. It also comes from food you eat. High cholesterol means that you have too much of the fat in your blood. This raises your risk of a heart attack and stroke. LDL and HDL are part of your total cholesterol. LDL is the \"bad\" cholesterol. High LDL can raise your risk for coronary artery disease, heart attack, and stroke. HDL is the \"good\" cholesterol. It helps clear bad cholesterol from the body. High HDL is linked with a lower risk of coronary artery disease, heart attack, and stroke. Your cholesterol levels help your doctor find out your risk for having a heart attack or stroke. You and your doctor can talk about whether you need to lower your risk and what treatment is best for you. Treatment options include a heart-healthy lifestyle and medicine. Both options can help lower your cholesterol and your risk. The way you choose to lower your risk will depend on how high your risk is for heart attack and stroke. It will also depend on how you feel about taking medicines. Follow-up care is a key part of your treatment and safety. Be sure to make and go to all appointments, and call your doctor if you are having problems. It's also a good idea to know your test results and keep a list of the medicines you take. How can you care for yourself at home? · Eat heart-healthy foods. ? Eat fruits, vegetables, whole grains, beans, and other high-fiber foods. ? Eat lean proteins, such as seafood, lean meats, beans, nuts, and soy products. ? Eat healthy fats, such as canola and olive oil. ? Choose foods that are low in saturated fat. ? Limit sodium and alcohol. ? Limit drinks and foods with added sugar. · Be physically active. Try to do moderate activity at least 2½ hours a week.  Or try to do vigorous activity at least 1¼ hours a week. You may want to walk or try other activities, such as running, swimming, cycling, or playing tennis or team sports. · Stay at a healthy weight or lose weight by making the changes in eating and physical activity listed above. Losing just a small amount of weight, even 5 to 10 pounds, can help reduce your risk for having a heart attack or stroke. · Do not smoke. · Manage other health problems. These include diabetes and high blood pressure. If you think you may have a problem with alcohol or drug use, talk to your doctor. · If you take medicine, take it exactly as prescribed. Call your doctor if you think you are having a problem with your medicine. · Check with your doctor or pharmacist before you use any other medicines, including over-the-counter medicines. Make sure your doctor knows all of the medicines, vitamins, herbal products, and supplements you take. Taking some medicines together can cause problems. When should you call for help? Watch closely for changes in your health, and be sure to contact your doctor if: 
  · You need help making lifestyle changes.  
  · You have questions about your medicine. Where can you learn more? Go to http://www.gray.com/ Enter N261 in the search box to learn more about \"High Cholesterol: Care Instructions. \" Current as of: April 29, 2021               Content Version: 13.0 © 2006-2021 Healthwise, [employer]. Care instructions adapted under license by [employer] (which disclaims liability or warranty for this information). If you have questions about a medical condition or this instruction, always ask your healthcare professional. [place] [address] any warranty or liability for your use of this information. Heart-Healthy Diet: Care Instructions Your Care Instructions A heart-healthy diet has lots of vegetables, fruits, nuts, beans, and whole grains, and is low in salt.  It limits foods that are high in saturated fat, such as meats, cheeses, and fried foods. It may be hard to change your diet, but even small changes can lower your risk of heart attack and heart disease. Follow-up care is a key part of your treatment and safety. Be sure to make and go to all appointments, and call your doctor if you are having problems. It's also a good idea to know your test results and keep a list of the medicines you take. How can you care for yourself at home? Watch your portions · Use food labels to learn what the recommended servings are for the foods you eat. · Eat only the number of calories you need to stay at a healthy weight. If you need to lose weight, eat fewer calories than your body burns (through exercise and other physical activity). Eat more fruits and vegetables · Eat a variety of fruit and vegetables every day. Dark green, deep orange, red, or yellow fruits and vegetables are especially good for you. Examples include spinach, carrots, peaches, and berries. · Keep carrots, celery, and other veggies handy for snacks. Buy fruit that is in season and store it where you can see it so that you will be tempted to eat it. · Cook dishes that have a lot of veggies in them, such as stir-fries and soups. Limit saturated fat · Read food labels, and try to avoid saturated fats. They increase your risk of heart disease. · Use olive or canola oil when you cook. · Bake, broil, grill, or steam foods instead of frying them. · Choose lean meats instead of high-fat meats such as hot dogs and sausages. Cut off all visible fat when you prepare meat. · Eat fish, skinless poultry, and meat alternatives such as soy products instead of high-fat meats. Soy products, such as tofu, may be especially good for your heart. · Choose low-fat or fat-free milk and dairy products. Eat foods high in fiber · Eat a variety of grain products every day. Include whole-grain foods that have lots of fiber and nutrients. Examples of whole-grain foods include oats, whole wheat bread, and brown rice. · Buy whole-grain breads and cereals, instead of white bread or pastries. Limit salt and sodium · Limit how much salt and sodium you eat to help lower your blood pressure. · Taste food before you salt it. Add only a little salt when you think you need it. With time, your taste buds will adjust to less salt. · Eat fewer snack items, fast foods, and other high-salt, processed foods. Check food labels for the amount of sodium in packaged foods. · Choose low-sodium versions of canned goods (such as soups, vegetables, and beans). Limit sugar · Limit drinks and foods with added sugar. These include candy, desserts, and soda pop. Limit alcohol · Limit alcohol to no more than 2 drinks a day for men and 1 drink a day for women. Too much alcohol can cause health problems. When should you call for help? Watch closely for changes in your health, and be sure to contact your doctor if: 
  · You would like help planning heart-healthy meals. Where can you learn more? Go to http://www.azul.com/ Enter V137 in the search box to learn more about \"Heart-Healthy Diet: Care Instructions. \" Current as of: December 17, 2020               Content Version: 13.0 © 2006-2021 Healthwise, [employer]. Care instructions adapted under license by [employer] (which disclaims liability or warranty for this information). If you have questions about a medical condition or this instruction, always ask your healthcare professional. [place] [address] any warranty or liability for your use of this information. You have Prediabetes. This means your blood sugars are higher than normal on average. Prediabetes is a warning sign that you are at risk for getting type 2 diabetes.  Most people who get type 2 diabetes have prediabetes first. The good news is that lifestyle changes may help you get your blood sugar back to normal and avoid or delay diabetes. The following can help you improve and control your blood sugar. · Food Choices · Be Mindful of CARBOHYDRATES. Carbohydrates= sugars. This means breads, pasta, rice, chips, desserts, starchy vegetables, etc. 
· Decrease how OFTEN you have carbohydrates · Decrease how MUCH carbohydrates you eat at one time · Choose carbs that are better for you, like whole grain. · Try to eliminate sugar from your drinks. (Soda, sweet tea, lemonade, juice, gatorade, alcohol, etc). · Let me know if you would like to see a Nutritionist to discuss more. A Nutritionist can help you develop a meal plan that fits your lifestyle. · Physical Activity · Move more. · Try to walk 150 minutes per week. · Try to walk 10,000 steps per day · Consider counting your steps on your smart phone. · Weight Loss · Moving more and eating better are good for you no matter what. · If you go enough of them you can achieve a healthier weight. This will help your blood sugars. · Do Not Smoke · Smoking can make prediabetes worse. · If you need help quitting, talk to me about stop-smoking programs and medicines. These can increase your chances of quitting for good. Watch closely for changes in your health, and be sure to contact me if: 
You have any symptoms of diabetes. These may include:  
Being thirsty more often. Urinating more. Being hungrier. Losing weight. Being very tired. Having blurry vision. You have a wound that will not heal.  
You have an infection that will not go away. You have problems with your blood pressure. Follow-up care is a key part of your treatment and safety. Please make an appointment in 6 months to reassess your prediabetes and A1c value.

## 2021-11-04 NOTE — PROGRESS NOTES
Rossana Garcia is a 62 y.o. female     Chief Complaint   Patient presents with    [facility] care/       Visit Vitals  /68 (BP 1 Location: Left arm, BP Patient Position: Sitting, BP Cuff Size: Adult)   Pulse 85   Temp 97.5 °F (36.4 °C) (Temporal)   Resp 16   Ht 5' 5\" (1.651 m)   Wt 184 lb 12.8 oz (83.8 kg)   SpO2 97%   BMI 30.75 kg/m²       Health Maintenance Due   Topic Date Due    Breast Cancer Screen Mammogram  08/02/2020    Colorectal Cancer Screening Combo  04/01/2021       1. Have you been to the ER, urgent care clinic since your last vis nit? Hospitalized since your last visit? No     2. Have you seen or consulted any other health care providers outside of the [address] [place] [address] since your last visit? Include any pap smears or colon screening. No     Has had mammogram done in October 13, 2021 by Dr. Jaye Coyne at [facility]. No recent colonoscopy.

## 2021-11-04 NOTE — PROGRESS NOTES
HPI:  62 y.o.  presents for follow up appointment and to establish care, prior PCP Dr Donya Leblanc who recently left the practice, formerly with Dr Nova Ponce who retired. No hospital, ER or specialist visits since last primary care visit except as noted below.     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    C/O weight gain over the last year  Less active and eating more during the pandemic  Her work had meals provided so she was eating more  She stopped exercising  Pre-pandemic 6/2019 173 lbs, now 184 lbs  She participated in [facility] last year 2020, but was inconvenient location - but with that plan she reduced calorie intake, was prescribed an appetite suppressant for 5 months, vitamin shots twice a week, and was on daily vitamin pills and water intake    Prediabetes  A1C 5.9% 5/2017  Most recent 5.5% 6/2021    No h/o thyroid disease in self  Normal TSH 6/2021    Dyslipidemia   Increased cholesterol on recent check 6/2021 showed  Total 280    HDL 73    -she admits to liking beef  -her MGM had CABG in her 62s    B knee OA  Recent right knee meniscus repair 2020  ACL repair left knee prior to 2011  Hurts to exercise, she played softball, golf, walking, elliptical   Now does walking, yard work, goes up and down her stairs at home    HSV Cold sores  Triggered by sun exposure  Famvir per dermatologist prn, Dr Christiana Iglesias skin lesion left leg 2018, now has visit q6mos        Patient Active Problem List    Diagnosis    Dysuria    Diverticulosis of colon    Constipation    Recurrent HSV (herpes simplex virus)    Pure hypercholesterolemia         Past Medical History:   Diagnosis Date    Atypical chest pain 1/7/10    Constipation, chronic     chronic issue but 7/10/15 [facility]  7/25/17 f/u note from [address] dermatitis and other eczema, due to unspecified cause     Diverticula of colon     Estrogen deficiency 8/7/2009  H/O cold sores     Hypercholesterolemia     Insomnia     Nausea & vomiting     Other pyelonephritis or pyonephrosis, not specified as acute or chronic 1980    Pharyngitis 10/4/15    [facility]    Psychiatric disorder     anxiety    Radius fracture 11/2013    left wrist @ [facility] then to TOSS       Social History     Tobacco Use    Smoking status: Never Smoker    Smokeless tobacco: Never Used   Vaping Use    Vaping Use: Never used   Substance Use Topics    Alcohol use: Yes     Alcohol/week: 0.0 standard drinks     Comment: 3 drinks/week    Drug use: No     Types: Prescription, OTC       Outpatient Medications Marked as Taking for the 11/4/21 encounter (Office Visit) with Wendy Guardado PA-C   Medication Sig Dispense Refill    traZODone (DESYREL) 100 mg tablet TAKE 1 TO 1 AND 1/2 TABLETS BY MOUTH NIGHTLY AT BEDTIME. 45 Tablet 0    famciclovir (FAMVIR) 500 mg tablet TAKE 3 TABLETS BY MOUTH ONCE WHEN SYMPTOMS OCCUE      cyanocobalamin 1,000 mcg tablet Take 1,000 mcg by mouth daily. Allergies   Allergen Reactions    Codeine Nausea and Vomiting    Pcn [Penicillins] Hives    Sulfa (Sulfonamide Antibiotics) Hives       ROS:  ROS negative except as per HPI. PE:  Visit Vitals  /68 (BP 1 Location: Left arm, BP Patient Position: Sitting, BP Cuff Size: Adult)   Pulse 85   Temp 97.5 °F (36.4 °C) (Temporal)   Resp 16   Ht 5' 5\" (1.651 m)   Wt 184 lb 12.8 oz (83.8 kg)   SpO2 97%   BMI 30.75 kg/m²     Gen: alert, oriented, no acute distress  Head: normocephalic, atraumatic  Eyes: pupils equal round reactive to light, sclera clear, conjunctiva clear  Oral: masked  Neck: supple  Resp: no increase work of breathing. Neuro: grossly intact  Skin: no lesion or rash  Extremities: no cyanosis or edema    No results found for this visit on 11/04/21.     Results for orders placed or performed in visit on 06/07/21   HEMOGLOBIN A1C WITH EAG   Result Value Ref Range    Hemoglobin A1c 5.5 4.0 - 5.6 %    Est. average glucose 111 mg/dL   VITAMIN D, 25 HYDROXY   Result Value Ref Range    Vitamin D 25-Hydroxy 28.8 (L) 30 - 100 ng/mL   TSH 3RD GENERATION   Result Value Ref Range    TSH 0.86 0.36 - 7.52 uIU/mL   METABOLIC PANEL, COMPREHENSIVE   Result Value Ref Range    Sodium 138 136 - 145 mmol/L    Potassium 4.4 3.5 - 5.1 mmol/L    Chloride 106 97 - 108 mmol/L    CO2 28 21 - 32 mmol/L    Anion gap 4 (L) 5 - 15 mmol/L    Glucose 114 (H) 65 - 100 mg/dL    BUN 17 6 - 20 MG/DL    Creatinine 0.76 0.55 - 1.02 MG/DL    BUN/Creatinine ratio 22 (H) 12 - 20      GFR est AA >60 >60 ml/min/1.73m2    GFR est non-AA >60 >60 ml/min/1.73m2    Calcium 9.6 8.5 - 10.1 MG/DL    Bilirubin, total 0.4 0.2 - 1.0 MG/DL    ALT (SGPT) 23 12 - 78 U/L    AST (SGOT) 26 15 - 37 U/L    Alk. phosphatase 101 45 - 117 U/L    Protein, total 7.4 6.4 - 8.2 g/dL    Albumin 4.2 3.5 - 5.0 g/dL    Globulin 3.2 2.0 - 4.0 g/dL    A-G Ratio 1.3 1.1 - 2.2     LIPID PANEL   Result Value Ref Range    Cholesterol, total 280 (H) <200 MG/DL    Triglyceride 246 (H) <150 MG/DL    HDL Cholesterol 73 MG/DL    LDL, calculated 157.8 (H) 0 - 100 MG/DL    VLDL, calculated 49.2 MG/DL    CHOL/HDL Ratio 3.8 0.0 - 5.0     CBC W/O DIFF   Result Value Ref Range    WBC 4.3 3.6 - 11.0 K/uL    RBC 4.36 3.80 - 5.20 M/uL    HGB 13.2 11.5 - 16.0 g/dL    HCT 40.5 35.0 - 47.0 %    MCV 92.9 80.0 - 99.0 FL    MCH 30.3 26.0 - 34.0 PG    MCHC 32.6 30.0 - 36.5 g/dL    RDW 11.6 11.5 - 14.5 %    PLATELET 155 648 - 239 K/uL    MPV 12.2 8.9 - 12.9 FL    NRBC 0.0 0  WBC    ABSOLUTE NRBC 0.00 0.00 - 0.01 K/uL         Assessment/Plan:      ICD-10-CM ICD-9-CM    1. Insomnia due to stress  F51.02 307.41 traZODone (DESYREL) 100 mg tablet   2. Pure hypercholesterolemia  E78.00 272.0    3. Prediabetes  R73.03 790.29    4. Impaired glucose tolerance  R73.02 790.22    5.  Class 1 obesity due to excess calories with serious comorbidity and body mass index (BMI) of 30.0 to 30.9 in adult  E66.09 278.00 REFERRAL TO WEIGHT LOSS    Z68.30 V85.30    6. Primary osteoarthritis of both knees  M17.0 715.16      Continue trazodone for insomnia  Recent labs reviewed  Low fat diet reviewed, avoid red meat, increase fish and chicken  Exercise tips, increase water intake  Prior prediabetes, now controlled, reviewed low carb diet  Refer to weight management Dr Crow Mosquera gel prn knee pain, bicycle, swimming      Health Maintenance reviewed - updated. Orders Placed This Encounter    REFERRAL TO WEIGHT LOSS     Referral Priority:   Routine     Referral Type:   Consultation     Referral Reason:   Specialty Services Required     Referred to Provider:   Esteban Sexton DO     Number of Visits Requested:   1    traZODone (DESYREL) 100 mg tablet     Sig: TAKE 1 TO 1 AND 1/2 TABLETS BY MOUTH NIGHTLY AT BEDTIME. Dispense:  135 Tablet     Refill:  1       Medications Discontinued During This Encounter   Medication Reason    traZODone (DESYREL) 162 mg tablet DUPLICATE ORDER    TRULANCE 3 mg tab LIST CLEANUP    traZODone (DESYREL) 100 mg tablet REORDER       Current Outpatient Medications   Medication Sig Dispense Refill    traZODone (DESYREL) 100 mg tablet TAKE 1 TO 1 AND 1/2 TABLETS BY MOUTH NIGHTLY AT BEDTIME. 135 Tablet 1    famciclovir (FAMVIR) 500 mg tablet TAKE 3 TABLETS BY MOUTH ONCE WHEN SYMPTOMS OCCUE      cyanocobalamin 1,000 mcg tablet Take 1,000 mcg by mouth daily. Recommended healthy diet low in carbohydrates, fats, sodium and cholesterol. Recommended regular cardiovascular exercise 3-6 times per week for 30-60 minutes daily. Verbal and written instructions (see AVS) provided. Patient expresses understanding of diagnosis and treatment plan. Follow-up and Dispositions    · Return in about 6 months (around 5/4/2022) for insomnia.        Future Appointments   Date Time Provider Jairo Gilbert   5/12/2022  4:00 PM Wendy Guardado PA-C [facility]

## 2021-11-05 ENCOUNTER — TELEPHONE (OUTPATIENT)
Dept: SURGERY | Age: 61
End: 2021-11-05

## 2021-11-17 DIAGNOSIS — E66.01 MORBID OBESITY (HCC): ICD-10-CM

## 2021-11-17 DIAGNOSIS — Z76.89 ENCOUNTER FOR WEIGHT MANAGEMENT: Primary | ICD-10-CM

## 2022-03-19 PROBLEM — R73.03 PREDIABETES: Status: ACTIVE | Noted: 2021-11-04

## 2022-03-19 PROBLEM — R30.0 DYSURIA: Status: ACTIVE | Noted: 2019-06-23

## 2022-08-17 DIAGNOSIS — F51.02 INSOMNIA DUE TO STRESS: ICD-10-CM

## 2022-08-17 NOTE — TELEPHONE ENCOUNTER
PCP: Delmar Mendes PA-C    Last appt: 11/4/2021  No future appointments. Requested Prescriptions     Pending Prescriptions Disp Refills    traZODone (DESYREL) 100 mg tablet 45 Tablet 0     Sig: TAKE 1 TO 1 1/2 TABLETS BY MOUTH NIGHTLY AT BEDTIME.  OFFICE VISIT DUE BEFORE NEXT REFILL       Prior labs and Blood pressures:  BP Readings from Last 3 Encounters:   11/04/21 110/68   06/07/21 106/70   11/25/19 136/90     Lab Results   Component Value Date/Time    Sodium 138 06/07/2021 09:11 AM    Potassium 4.4 06/07/2021 09:11 AM    Chloride 106 06/07/2021 09:11 AM    CO2 28 06/07/2021 09:11 AM    Anion gap 4 (L) 06/07/2021 09:11 AM    Glucose 114 (H) 06/07/2021 09:11 AM    BUN 17 06/07/2021 09:11 AM    Creatinine 0.76 06/07/2021 09:11 AM    BUN/Creatinine ratio 22 (H) 06/07/2021 09:11 AM    GFR est AA >60 06/07/2021 09:11 AM    GFR est non-AA >60 06/07/2021 09:11 AM    Calcium 9.6 06/07/2021 09:11 AM     Lab Results   Component Value Date/Time    Hemoglobin A1c 5.5 06/07/2021 09:11 AM     Lab Results   Component Value Date/Time    Cholesterol, total 280 (H) 06/07/2021 09:11 AM    HDL Cholesterol 73 06/07/2021 09:11 AM    LDL, calculated 157.8 (H) 06/07/2021 09:11 AM    VLDL, calculated 49.2 06/07/2021 09:11 AM    Triglyceride 246 (H) 06/07/2021 09:11 AM    CHOL/HDL Ratio 3.8 06/07/2021 09:11 AM     Lab Results   Component Value Date/Time    Vitamin D 25-Hydroxy 28.8 (L) 06/07/2021 09:11 AM       Lab Results   Component Value Date/Time    TSH 0.86 06/07/2021 09:11 AM

## 2022-08-19 RX ORDER — TRAZODONE HYDROCHLORIDE 100 MG/1
TABLET ORAL
Qty: 45 TABLET | Refills: 0 | Status: SHIPPED | OUTPATIENT
Start: 2022-08-19 | End: 2022-10-16 | Stop reason: SDUPTHER

## 2022-10-31 DIAGNOSIS — F51.02 INSOMNIA DUE TO STRESS: ICD-10-CM

## 2022-10-31 NOTE — TELEPHONE ENCOUNTER
PCP: Amber Hanley PA-C    Last appt: 11/4/2021  No future appointments. Requested Prescriptions     Pending Prescriptions Disp Refills    traZODone (DESYREL) 100 mg tablet 30 Tablet 0     Sig: TAKE 1 TO 1 1/2 TABLETS BY MOUTH NIGHTLY AT BEDTIME.  OFFICE VISIT DUE BEFORE NEXT REFILL         Other Comments:

## 2022-11-01 RX ORDER — TRAZODONE HYDROCHLORIDE 100 MG/1
TABLET ORAL
Qty: 30 TABLET | Refills: 0 | OUTPATIENT
Start: 2022-11-01

## 2022-11-02 DIAGNOSIS — F51.02 INSOMNIA DUE TO STRESS: ICD-10-CM

## 2022-11-02 NOTE — TELEPHONE ENCOUNTER
PCP: Yuni Bhatia PA-C    Last appt: 11/4/2021  No future appointments. Requested Prescriptions     Pending Prescriptions Disp Refills    traZODone (DESYREL) 100 mg tablet 30 Tablet 0     Sig: TAKE 1 TO 1 1/2 TABLETS BY MOUTH NIGHTLY AT BEDTIME.  OFFICE VISIT DUE BEFORE NEXT REFILL         Other Comments:

## 2022-11-02 NOTE — TELEPHONE ENCOUNTER
PCP: Umm Trimble PA-C    Last appt: 11/4/2021  No future appointments. Requested Prescriptions     Pending Prescriptions Disp Refills    traZODone (DESYREL) 100 mg tablet 30 Tablet 0     Sig: TAKE 1 TO 1 1/2 TABLETS BY MOUTH NIGHTLY AT BEDTIME.  OFFICE VISIT DUE BEFORE NEXT REFILL       Prior labs and Blood pressures:  BP Readings from Last 3 Encounters:   11/04/21 110/68   06/07/21 106/70   11/25/19 136/90     Lab Results   Component Value Date/Time    Sodium 138 06/07/2021 09:11 AM    Potassium 4.4 06/07/2021 09:11 AM    Chloride 106 06/07/2021 09:11 AM    CO2 28 06/07/2021 09:11 AM    Anion gap 4 (L) 06/07/2021 09:11 AM    Glucose 114 (H) 06/07/2021 09:11 AM    BUN 17 06/07/2021 09:11 AM    Creatinine 0.76 06/07/2021 09:11 AM    BUN/Creatinine ratio 22 (H) 06/07/2021 09:11 AM    GFR est AA >60 06/07/2021 09:11 AM    GFR est non-AA >60 06/07/2021 09:11 AM    Calcium 9.6 06/07/2021 09:11 AM     Lab Results   Component Value Date/Time    Hemoglobin A1c 5.5 06/07/2021 09:11 AM     Lab Results   Component Value Date/Time    Cholesterol, total 280 (H) 06/07/2021 09:11 AM    HDL Cholesterol 73 06/07/2021 09:11 AM    LDL, calculated 157.8 (H) 06/07/2021 09:11 AM    VLDL, calculated 49.2 06/07/2021 09:11 AM    Triglyceride 246 (H) 06/07/2021 09:11 AM    CHOL/HDL Ratio 3.8 06/07/2021 09:11 AM     Lab Results   Component Value Date/Time    Vitamin D 25-Hydroxy 28.8 (L) 06/07/2021 09:11 AM       Lab Results   Component Value Date/Time    TSH 0.86 06/07/2021 09:11 AM

## 2022-11-08 ENCOUNTER — TELEPHONE (OUTPATIENT)
Dept: INTERNAL MEDICINE CLINIC | Age: 62
End: 2022-11-08

## 2022-11-08 RX ORDER — TRAZODONE HYDROCHLORIDE 100 MG/1
TABLET ORAL
Qty: 90 TABLET | Refills: 0 | Status: SHIPPED | OUTPATIENT
Start: 2022-11-08

## 2022-11-08 NOTE — TELEPHONE ENCOUNTER
----- Message from Luis Neves sent at 11/8/2022  8:59 AM EST -----  Subject: Appointment Request    Reason for Call: Established Patient Appointment needed: Routine Existing   Condition Follow Up    QUESTIONS    Reason for appointment request? No appointments available during search     Additional Information for Provider?  Pt needs an appointment or video for   sleeping medication she only 2 left but no appointments to put her in.  ---------------------------------------------------------------------------  --------------  [id number] [employer]  [phone]; OK to leave message on voicemail  ---------------------------------------------------------------------------  --------------  SCRIPT ANSWERS  COVID Screen: [facility]

## 2022-11-08 NOTE — TELEPHONE ENCOUNTER
Called and spoke to patient and advised of pcp's schedule change. Patient stated that she doesn't need anything other than her refill of her sleeping medication to get her to when the new nurse practitioner gets to our office. Please advise as the patient has not been seen in office since 11/2021.

## 2023-01-04 ENCOUNTER — OFFICE VISIT (OUTPATIENT)
Dept: URGENT CARE | Age: 63
End: 2023-01-04
Payer: COMMERCIAL

## 2023-01-04 VITALS
BODY MASS INDEX: 28.12 KG/M2 | SYSTOLIC BLOOD PRESSURE: 111 MMHG | HEART RATE: 95 BPM | RESPIRATION RATE: 18 BRPM | WEIGHT: 169 LBS | OXYGEN SATURATION: 99 % | DIASTOLIC BLOOD PRESSURE: 71 MMHG | TEMPERATURE: 97.9 F

## 2023-01-04 DIAGNOSIS — R68.89 FLU-LIKE SYMPTOMS: ICD-10-CM

## 2023-01-04 DIAGNOSIS — M79.10 MYALGIA: ICD-10-CM

## 2023-01-04 DIAGNOSIS — U07.1 COVID-19: Primary | ICD-10-CM

## 2023-01-04 LAB
FLUAV+FLUBV AG NOSE QL IA.RAPID: NEGATIVE
FLUAV+FLUBV AG NOSE QL IA.RAPID: NEGATIVE
SARS-COV-2 PCR, POC: POSITIVE
VALID INTERNAL CONTROL?: YES

## 2023-01-04 PROCEDURE — 87635 SARS-COV-2 COVID-19 AMP PRB: CPT | Performed by: NURSE PRACTITIONER

## 2023-01-04 PROCEDURE — 99213 OFFICE O/P EST LOW 20 MIN: CPT | Performed by: NURSE PRACTITIONER

## 2023-01-04 PROCEDURE — 96372 THER/PROPH/DIAG INJ SC/IM: CPT | Performed by: NURSE PRACTITIONER

## 2023-01-04 PROCEDURE — 87804 INFLUENZA ASSAY W/OPTIC: CPT | Performed by: NURSE PRACTITIONER

## 2023-01-04 RX ORDER — IBUPROFEN 800 MG/1
800 TABLET ORAL
Qty: 30 TABLET | Refills: 0 | Status: SHIPPED | OUTPATIENT
Start: 2023-01-04

## 2023-01-04 RX ORDER — KETOROLAC TROMETHAMINE 30 MG/ML
30 INJECTION, SOLUTION INTRAMUSCULAR; INTRAVENOUS
Status: COMPLETED | OUTPATIENT
Start: 2023-01-04 | End: 2023-01-04

## 2023-01-04 RX ADMIN — KETOROLAC TROMETHAMINE 30 MG: 30 INJECTION, SOLUTION INTRAMUSCULAR; INTRAVENOUS at 09:40

## 2023-01-04 NOTE — PROGRESS NOTES
Fever   Associated symptoms include congestion, headaches, sore throat and cough. Pertinent negatives include no chest pain, no diarrhea, no vomiting and no shortness of breath. Pt presents with complaints of subjective fevers, chills, cough, sore throat, congestion, myalgias for 2 days. Denies any CP, SOB, N/V/D. No known exposures. Has had covid and flu vaccines. Taking tylenol and ibuprofen without relief. Took ibuprofen 3am this morning.  at bedside.     Past Medical History:   Diagnosis Date    Atypical chest pain 1/7/10    Constipation, chronic     chronic issue but 7/10/15 [facility]  17 f/u note from [employer] dermatitis and other eczema, due to unspecified cause     Diverticula of colon     Estrogen deficiency 2009    H/O cold sores     Hypercholesterolemia     Insomnia     Nausea & vomiting     Other pyelonephritis or pyonephrosis, not specified as acute or chronic 1980    Pharyngitis 10/4/15    [facility]    Psychiatric disorder     anxiety    Radius fracture 2013    left wrist @ [facility] then to TOSS        Past Surgical History:   Procedure Laterality Date    HX  SECTION      X1    HX DILATION AND CURETTAGE      X2    HX ENDOSCOPY  2017    Chyrl Birkenhead rec'd and normal mucosa    HX GYN  06    RICHARD BSO    HX HYSTERECTOMY      HX ORTHOPAEDIC  05    L knee ant cruc repair    HX OTHER SURGICAL      exploratory lap    I&D ABCESS COMP/MULTIPLE  11 [facility] er    HI COLONOSCOPY FLX DX W/COLLJ SPEC WHEN PFRMD  2011              Family History   Problem Relation Age of Onset    Hypertension Mother     Thyroid Disease Mother     Heart Disease Maternal Grandmother     Diabetes Maternal Grandmother     Hypertension Brother         Social History     Socioeconomic History    Marital status:      Spouse name: Not on file    Number of children: Not on file    Years of education: Not on file    Highest education level: Not on file   Occupational History    Not on file   Tobacco Use    Smoking status: Never    Smokeless tobacco: Never   Vaping Use    Vaping Use: Never used   Substance and Sexual Activity    Alcohol use: Yes     Alcohol/week: 0.0 standard drinks     Comment: 3 drinks/week    Drug use: No     Types: Prescription, OTC    Sexual activity: Yes     Partners: Male   Other Topics Concern    Not on file   Social History Narrative    Not on file     Social Determinants of Health     Financial Resource Strain: Not on file   Food Insecurity: Not on file   Transportation Needs: Not on file   Physical Activity: Not on file   Stress: Not on file   Social Connections: Not on file   Intimate Partner Violence: Not on file   Housing Stability: Not on file                ALLERGIES: Codeine, Pcn [penicillins], and Sulfa (sulfonamide antibiotics)    Review of Systems   Constitutional:  Positive for chills, fatigue and fever. HENT:  Positive for congestion, rhinorrhea and sore throat. Respiratory:  Positive for cough. Negative for shortness of breath and wheezing. Cardiovascular:  Negative for chest pain. Gastrointestinal:  Positive for abdominal pain. Negative for diarrhea, nausea and vomiting. Musculoskeletal:  Positive for myalgias. Neurological:  Positive for headaches. Vitals:    01/04/23 0847   BP: 111/71   Pulse: 95   Resp: 18   Temp: 97.9 °F (36.6 °C)   SpO2: 99%   Weight: 169 lb (76.7 kg)       Physical Exam  Constitutional:       General: She is not in acute distress. Appearance: She is well-developed. She is ill-appearing. She is not toxic-appearing. Comments: tearful   HENT:      Head: Normocephalic and atraumatic. Right Ear: Tympanic membrane, ear canal and external ear normal.      Left Ear: Tympanic membrane, ear canal and external ear normal.      Nose: Congestion and rhinorrhea present. Mouth/Throat:      Mouth: Mucous membranes are moist.      Pharynx: Oropharynx is clear.    Eyes:      Extraocular Movements: Extraocular movements intact. Conjunctiva/sclera: Conjunctivae normal.      Pupils: Pupils are equal, round, and reactive to light. Cardiovascular:      Rate and Rhythm: Normal rate and regular rhythm. Heart sounds: Normal heart sounds. Pulmonary:      Effort: Pulmonary effort is normal.      Breath sounds: Normal breath sounds. Abdominal:      General: Abdomen is flat. Bowel sounds are normal. There is no distension. Palpations: Abdomen is soft. There is no mass. Tenderness: There is no abdominal tenderness. There is no guarding or rebound. Musculoskeletal:      Cervical back: Normal range of motion and neck supple. Lymphadenopathy:      Cervical: No cervical adenopathy. Skin:     General: Skin is warm and dry. Neurological:      General: No focal deficit present. Mental Status: She is alert and oriented to person, place, and time. Results for orders placed or performed in visit on 01/04/23   AMB POC DENNIS INFLUENZA A/B TEST   Result Value Ref Range    VALID INTERNAL CONTROL POC Yes     Influenza A Ag POC Negative Negative    Influenza B Ag POC Negative Negative   POCT COVID-19, SARS-COV-2, PCR   Result Value Ref Range    SARS-COV-2 PCR, POC Positive (A) Negative       ICD-10-CM ICD-9-CM   1. COVID-19  U07.1 079.89   2. Flu-like symptoms  R68.89 780.99   3. Myalgia  M79.10 729.1       Orders Placed This Encounter    AMB POC DENNIS INFLUENZA A/B TEST    POCT COVID-19, SARS-COV-2, PCR     Order Specific Question:   Is this test for diagnosis or screening? Answer:   Diagnosis of ill patient     Order Specific Question:   Symptomatic for COVID-19 as defined by CDC? Answer:   Yes     Order Specific Question:   Date of Symptom Onset     Answer:   1/2/2023     Order Specific Question:   Hospitalized for COVID-19? Answer:   No     Order Specific Question:   Admitted to ICU for COVID-19? Answer:   No     Order Specific Question:   Employed in healthcare setting? Answer:   No     Order Specific Question:   Resident in a congregate (group) care setting? Answer:   No     Order Specific Question:   Pregnant? Answer:   No     Order Specific Question:   Previously tested for COVID-19? Answer:   Yes    ketorolac (TORADOL) injection 30 mg      The patient's  was called for notification of a POSITIVE test result for COVID-19. The following information was given to the patient:    The COVID-19 test result was positive. Mild and stable symptoms are managed at home. Day 0 is your first day of symptoms or a positive test.  Day 1 is the first day after your symptoms started or your test specimen was collected. If you have COVID-19 or have symptoms  Stay home for at least 5 days and isolate from others in your home. Wear a well-fitted mask if you must be around others in your home. End isolation after 5 full days if you are fever-free for 24 hours (without the use of fever-reducing medication) and your symptoms are improving. If you did NOT have symptoms  End isolation after at least 5 full days after your positive test.  Wear a well-fitted mask for 10 full days any time you are around others inside your home or in public. Do not go to places where you are unable to wear a mask. If you were severely ill with COVID-19, you should isolate for at least 10 days. Consult your doctor before ending isolation. Contact your medical provider if symptoms are worsening, such as difficulty breathing. To prevent spreading COVID  Avoid travel and avoid being around people who are at high risk  Wash hands often with soap and water for at least 20 seconds or alternatively use hand  with at least 60% alcohol content  Cover coughs and sneezes  Wear a mask when around others    For more information visit the CDC website: DotProtection.gl     The patient is to follow up with PCP.      If signs and symptoms become worse the pt is to go to the [facility].        Brian Johnson NP       MDM    Procedures

## 2023-01-26 DIAGNOSIS — F51.02 INSOMNIA DUE TO STRESS: ICD-10-CM

## 2023-01-27 RX ORDER — TRAZODONE HYDROCHLORIDE 100 MG/1
TABLET ORAL
Qty: 90 TABLET | Refills: 0 | Status: SHIPPED | OUTPATIENT
Start: 2023-01-27

## 2023-01-27 NOTE — TELEPHONE ENCOUNTER
PCP: Unknown, Provider    Last appt: Visit date not found  No future appointments. Requested Prescriptions     Pending Prescriptions Disp Refills    traZODone (DESYREL) 100 mg tablet 90 Tablet 0     Sig: TAKE 1 TO 1 1/2 TABLETS BY MOUTH NIGHTLY AT BEDTIME.  OFFICE VISIT DUE BEFORE NEXT REFILL       Prior labs and Blood pressures:  BP Readings from Last 3 Encounters:   01/04/23 111/71   11/04/21 110/68   06/07/21 106/70     Lab Results   Component Value Date/Time    Sodium 138 06/07/2021 09:11 AM    Potassium 4.4 06/07/2021 09:11 AM    Chloride 106 06/07/2021 09:11 AM    CO2 28 06/07/2021 09:11 AM    Anion gap 4 (L) 06/07/2021 09:11 AM    Glucose 114 (H) 06/07/2021 09:11 AM    BUN 17 06/07/2021 09:11 AM    Creatinine 0.76 06/07/2021 09:11 AM    BUN/Creatinine ratio 22 (H) 06/07/2021 09:11 AM    GFR est AA >60 06/07/2021 09:11 AM    GFR est non-AA >60 06/07/2021 09:11 AM    Calcium 9.6 06/07/2021 09:11 AM     Lab Results   Component Value Date/Time    Hemoglobin A1c 5.5 06/07/2021 09:11 AM     Lab Results   Component Value Date/Time    Cholesterol, total 280 (H) 06/07/2021 09:11 AM    HDL Cholesterol 73 06/07/2021 09:11 AM    LDL, calculated 157.8 (H) 06/07/2021 09:11 AM    VLDL, calculated 49.2 06/07/2021 09:11 AM    Triglyceride 246 (H) 06/07/2021 09:11 AM    CHOL/HDL Ratio 3.8 06/07/2021 09:11 AM     Lab Results   Component Value Date/Time    Vitamin D 25-Hydroxy 28.8 (L) 06/07/2021 09:11 AM       Lab Results   Component Value Date/Time    TSH 0.86 06/07/2021 09:11 AM

## 2023-06-22 ENCOUNTER — HOSPITAL ENCOUNTER (OUTPATIENT)
Facility: HOSPITAL | Age: 63
Discharge: HOME | End: 2023-06-22
Payer: COMMERCIAL

## 2023-06-22 DIAGNOSIS — K58.1 IRRITABLE BOWEL SYNDROME WITH CONSTIPATION: ICD-10-CM

## 2023-06-22 DIAGNOSIS — R10.30 ABDOMINAL PAIN, LOWER: ICD-10-CM

## 2023-06-22 DIAGNOSIS — K63.5 POLYP OF COLON, UNSPECIFIED PART OF COLON, UNSPECIFIED TYPE: ICD-10-CM

## 2023-06-22 PROCEDURE — 74177 CT ABD & PELVIS W/CONTRAST: CPT

## 2023-06-22 PROCEDURE — 6360000004 HC RX CONTRAST MEDICATION: Performed by: PHYSICIAN ASSISTANT

## 2023-06-22 RX ADMIN — IOPAMIDOL 100 ML: 755 INJECTION, SOLUTION INTRAVENOUS at 13:04

## 2023-10-04 ENCOUNTER — TRANSCRIBE ORDERS (OUTPATIENT)
Facility: HOSPITAL | Age: 63
End: 2023-10-04

## 2023-10-04 DIAGNOSIS — K57.92 DIVERTICULITIS: ICD-10-CM

## 2023-10-04 DIAGNOSIS — K63.5 POLYP OF COLON, UNSPECIFIED PART OF COLON, UNSPECIFIED TYPE: ICD-10-CM

## 2023-10-04 DIAGNOSIS — R10.32 LLQ ABDOMINAL PAIN: ICD-10-CM

## 2023-10-04 DIAGNOSIS — K58.1 IRRITABLE BOWEL SYNDROME WITH CONSTIPATION: Primary | ICD-10-CM

## 2023-10-17 ENCOUNTER — HOSPITAL ENCOUNTER (OUTPATIENT)
Facility: HOSPITAL | Age: 63
Discharge: HOME | End: 2023-10-20
Attending: INTERNAL MEDICINE
Payer: COMMERCIAL

## 2023-10-17 DIAGNOSIS — K57.92 DIVERTICULITIS: ICD-10-CM

## 2023-10-17 DIAGNOSIS — K58.1 IRRITABLE BOWEL SYNDROME WITH CONSTIPATION: ICD-10-CM

## 2023-10-17 DIAGNOSIS — K63.5 POLYP OF COLON, UNSPECIFIED PART OF COLON, UNSPECIFIED TYPE: ICD-10-CM

## 2023-10-17 DIAGNOSIS — R10.32 LLQ ABDOMINAL PAIN: ICD-10-CM

## 2023-10-17 PROCEDURE — 2500000003 HC RX 250 WO HCPCS: Performed by: INTERNAL MEDICINE

## 2023-10-17 PROCEDURE — 74177 CT ABD & PELVIS W/CONTRAST: CPT

## 2023-10-17 PROCEDURE — 6360000004 HC RX CONTRAST MEDICATION: Performed by: INTERNAL MEDICINE

## 2023-10-17 RX ADMIN — IOPAMIDOL 100 ML: 755 INJECTION, SOLUTION INTRAVENOUS at 08:50

## 2023-10-17 RX ADMIN — BARIUM SULFATE 900 ML: 20 SUSPENSION ORAL at 08:50

## 2024-05-18 ENCOUNTER — OFFICE VISIT (OUTPATIENT)
Age: 64
End: 2024-05-18

## 2024-05-18 VITALS
DIASTOLIC BLOOD PRESSURE: 80 MMHG | WEIGHT: 187 LBS | BODY MASS INDEX: 31.16 KG/M2 | HEART RATE: 104 BPM | TEMPERATURE: 99.1 F | HEIGHT: 65 IN | RESPIRATION RATE: 16 BRPM | SYSTOLIC BLOOD PRESSURE: 115 MMHG | OXYGEN SATURATION: 98 %

## 2024-05-18 DIAGNOSIS — U07.1 COVID-19: Primary | ICD-10-CM

## 2024-05-18 DIAGNOSIS — R05.1 ACUTE COUGH: ICD-10-CM

## 2024-05-18 RX ORDER — ROSUVASTATIN CALCIUM 10 MG/1
10 TABLET, COATED ORAL
COMMUNITY

## 2024-05-18 RX ORDER — BENZONATATE 100 MG/1
200 CAPSULE ORAL 3 TIMES DAILY PRN
Qty: 21 CAPSULE | Refills: 0 | Status: SHIPPED | OUTPATIENT
Start: 2024-05-18 | End: 2024-05-25

## 2024-05-18 ASSESSMENT — ENCOUNTER SYMPTOMS
SORE THROAT: 0
COUGH: 0
CHEST TIGHTNESS: 0
RHINORRHEA: 0
SHORTNESS OF BREATH: 0

## 2024-05-18 NOTE — PROGRESS NOTES
Subjective:      The patient/guardian gave verbal consent to treat.        Chief Complaint   Patient presents with    Covid Testing     Pt. Test positive for covid yesterday want treatment         Ebony Quiñones is a 63 y.o. female presenting to clinic today with COVID requesting treatment. She states that she first started with symptoms 2 days ago. Endorses headaches, fever, fatigue, joint pain, cough, constipation, and myalgias. Taking ibuprofen and nyquil. Tested positive for COVID last night. No other complaints today.        History provided by:  Patient  History limited by: nothing.   used: No          Review of Systems   Constitutional:  Positive for chills, fatigue and fever.   HENT:  Positive for congestion. Negative for rhinorrhea and sore throat.    Respiratory:  Negative for cough, chest tightness and shortness of breath.    Cardiovascular:  Negative for chest pain.   Gastrointestinal:  Positive for constipation.   Musculoskeletal:  Positive for arthralgias and myalgias.   Neurological:  Positive for headaches.       Review of Systems - negative except as listed above    Reviewed PmHx, RxHx, FmHx, SocHx, AllgHx and updated in chart.  Family History   Problem Relation Age of Onset    Thyroid Disease Mother     Hypertension Mother     Hypertension Brother     Diabetes Maternal Grandmother     Heart Disease Maternal Grandmother        Past Medical History:   Diagnosis Date    Atypical chest pain 1/7/10    Constipation, chronic     chronic issue but 7/10/15 [facility]  7/25/17 f/u note from [facility]    Contact dermatitis and other eczema, due to unspecified cause     Diverticula of colon     Estrogen deficiency 8/7/2009    H/O cold sores     Hypercholesterolemia     Insomnia     Nausea & vomiting     Other pyelonephritis or pyonephrosis, not specified as acute or chronic 1980    Pharyngitis 10/4/15    [facility]    Psychiatric disorder     anxiety    Radius fracture 11/2013

## 2024-10-04 ENCOUNTER — HOSPITAL ENCOUNTER (OUTPATIENT)
Facility: HOSPITAL | Age: 64
Discharge: HOME | End: 2024-10-07
Payer: COMMERCIAL

## 2024-10-04 DIAGNOSIS — M21.961 ACQUIRED DEFORMITY OF KNEE, RIGHT: ICD-10-CM

## 2024-10-04 PROCEDURE — 73700 CT LOWER EXTREMITY W/O DYE: CPT

## 2024-10-17 ENCOUNTER — HOSPITAL ENCOUNTER (OUTPATIENT)
Facility: HOSPITAL | Age: 64
Discharge: HOME | End: 2024-10-20
Attending: ORTHOPAEDIC SURGERY
Payer: COMMERCIAL

## 2024-10-17 DIAGNOSIS — M79.89 SOFT TISSUE MASS: ICD-10-CM

## 2024-10-17 PROCEDURE — A9579 GAD-BASE MR CONTRAST NOS,1ML: HCPCS | Performed by: RADIOLOGY

## 2024-10-17 PROCEDURE — 6360000004 HC RX CONTRAST MEDICATION: Performed by: RADIOLOGY

## 2024-10-17 PROCEDURE — 73720 MRI LWR EXTREMITY W/O&W/DYE: CPT

## 2024-10-17 RX ADMIN — GADOTERIDOL 15 ML: 279.3 INJECTION, SOLUTION INTRAVENOUS at 19:43

## 2024-11-23 ENCOUNTER — HOSPITAL ENCOUNTER (EMERGENCY)
Facility: HOSPITAL | Age: 64
Discharge: HOME | End: 2024-11-23
Payer: COMMERCIAL

## 2024-11-23 ENCOUNTER — APPOINTMENT (OUTPATIENT)
Facility: HOSPITAL | Age: 64
End: 2024-11-23
Payer: COMMERCIAL

## 2024-11-23 VITALS
TEMPERATURE: 97.2 F | HEART RATE: 84 BPM | HEIGHT: 65 IN | RESPIRATION RATE: 20 BRPM | WEIGHT: 166 LBS | SYSTOLIC BLOOD PRESSURE: 131 MMHG | BODY MASS INDEX: 27.66 KG/M2 | DIASTOLIC BLOOD PRESSURE: 85 MMHG | OXYGEN SATURATION: 94 %

## 2024-11-23 DIAGNOSIS — Z96.651 HISTORY OF RIGHT KNEE JOINT REPLACEMENT: ICD-10-CM

## 2024-11-23 DIAGNOSIS — M79.661 RIGHT CALF PAIN: Primary | ICD-10-CM

## 2024-11-23 LAB — ECHO BSA: 1.86 M2

## 2024-11-23 PROCEDURE — 6370000000 HC RX 637 (ALT 250 FOR IP): Performed by: PHYSICIAN ASSISTANT

## 2024-11-23 PROCEDURE — 99284 EMERGENCY DEPT VISIT MOD MDM: CPT

## 2024-11-23 PROCEDURE — 93971 EXTREMITY STUDY: CPT

## 2024-11-23 RX ORDER — DIAZEPAM 5 MG/1
5 TABLET ORAL ONCE
Status: COMPLETED | OUTPATIENT
Start: 2024-11-23 | End: 2024-11-23

## 2024-11-23 RX ADMIN — DIAZEPAM 5 MG: 5 TABLET ORAL at 11:52

## 2024-11-23 ASSESSMENT — LIFESTYLE VARIABLES
HOW OFTEN DO YOU HAVE A DRINK CONTAINING ALCOHOL: NEVER
HOW MANY STANDARD DRINKS CONTAINING ALCOHOL DO YOU HAVE ON A TYPICAL DAY: PATIENT DOES NOT DRINK

## 2024-11-23 ASSESSMENT — PAIN SCALES - GENERAL: PAINLEVEL_OUTOF10: 8

## 2024-11-23 ASSESSMENT — PAIN DESCRIPTION - LOCATION: LOCATION: KNEE

## 2024-11-23 ASSESSMENT — PAIN DESCRIPTION - ORIENTATION: ORIENTATION: RIGHT

## 2024-11-23 NOTE — ED NOTES
This RN has reviewed discharge instructions with the patient at this time. The Patient and Family member verbalized understanding and denies any further questions. Patient and family has been made aware of prescription(s) called into pharmacy for . Patient wheeled out to waiting room at this time.

## 2024-11-23 NOTE — ED PROVIDER NOTES
[facility]    Psychiatric disorder     anxiety    Radius fracture 2013    left wrist @ [facility] then to TOSS       Past Surgical History:  Past Surgical History:   Procedure Laterality Date     SECTION      X1    COLONOSCOPY FLX DX W/COLLJ SPEC WHEN PFRMD  2011         DILATION AND CURETTAGE OF UTERUS      X2    GYN  06    CAMILA BSO    HYSTERECTOMY (CERVIX STATUS UNKNOWN)      I&D ABCESS COMP/MULTIPLE  11 [facility] er    ORTHOPEDIC SURGERY  05    L knee ant cruc repair    OTHER SURGICAL HISTORY      exploratory lap    UPPER GASTROINTESTINAL ENDOSCOPY  2017    [facility]--path rec'd and normal mucosa       Family History:  Family History   Problem Relation Age of Onset    Thyroid Disease Mother     Hypertension Mother     Hypertension Brother     Diabetes Maternal Grandmother     Heart Disease Maternal Grandmother        Social History:  Social History     Tobacco Use    Smoking status: Never    Smokeless tobacco: Never   Substance Use Topics    Alcohol use: Yes     Alcohol/week: 0.0 standard drinks of alcohol    Drug use: No     Types: OTC, Prescription       Allergies:  Allergies   Allergen Reactions    Penicillins Hives    Sulfa Antibiotics Hives    Codeine Nausea And Vomiting           PHYSICAL EXAM      ED Triage Vitals [24 1115]   Encounter Vitals Group      BP (!) 140/84      Systolic BP Percentile       Diastolic BP Percentile       Pulse 84      Respirations 20      Temp 97.2 °F (36.2 °C)      Temp Source Oral      SpO2 97 %      Weight - Scale 75.3 kg (166 lb)      Height 1.651 m (5' 5\")      Head Circumference       Peak Flow       Pain Score       Pain Loc       Pain Education       Exclude from Growth Chart         Physical Exam  Constitutional:       Appearance: Normal appearance.   Cardiovascular:      Rate and Rhythm: Normal rate.      Pulses: Normal pulses.   Pulmonary:      Effort: Pulmonary effort is normal.   Abdominal:      General: There is no distension.  discussed)  None    Chronic Conditions: as noted above     Social Determinants affecting Dx or Tx: None    Records Reviewed (source and summary of external records): Nursing Notes and Old Medical Records    MDM (CC/HPI Summary, DDx, ED Course, Reassessment, Disposition Considerations -Tests not done, Shared Decision Making, Pt Expectation of Test or Tx.):        Patient is a 64 yo female  with past medical history as noted below, who presents to the ED for pain and swelling to right calf. Patient had a total knee replacement 11/19 by . Endorses gradually worsening right posterior calf swelling and pain since. Endorses swelling and bruising. Denies redness or warmth. Denies any new numbness or weakness. Denies fevers, chest pain or SOB. Denies exogenous hormone use or history of blood clots. Denies fevers.   States they called the on call line and advised to come to the ED for further evaluation       Patient presents to the ED for evaluation of right posterior calf pain and swelling, she is several days postop from a total knee replacement.  Endorses swelling and a tightness feeling.  Has had swelling and bruising but no redness or warmth.  Denies fevers.  No/hormone use.  No history blood clots.  Denies chest pain or shortness of breath.      Neurovascularly intact, range of motion intact.  Will obtain ultrasound rule out blood clot and reevaluate    Ultrasound without evidence of DVT.  Compartments soft.  Do not feel as though further labs or imaging needed at this time.  Low suspicion of deep space infection, necrotizing fasciitis, compartment syndrome, vascular compromise, osteomyelitis, or other emergent conditions requiring further evaluation or management acutely here at this time    ED Course as of 11/23/24 1910   Sat Nov 23, 2024   1257 Spoke with orthopedics on call here in the ED about perfect serve consult. Spoke verbally in person, no further management at this time.   DEAN Robb on call will

## 2024-11-23 NOTE — DISCHARGE INSTRUCTIONS
Final result by Hussain Bustamante MD (11/23/24 12:52:29)                Narrative:      No evidence of deep vein thrombosis in the right lower extremity. No  evidence of deep vein or superficial vein thrombosis in the right lower  extremity. Vessels demonstrate normal compressibility, color filling, and  phasic and spontaneous flow.        Thank You!    It was a pleasure taking care of you in our Emergency Department today. We know that when you come to [facility], you are entrusting us with your health, comfort, and safety. Our clinicians honor that trust, and truly appreciate the opportunity to care for you and your loved ones.    If you receive a survey about your Emergency Department experience today, please fill it out.  We value your feedback. Thank you.      Marcela Marsh PA-C    ___________________________________  I have included a copy of your lab results and/or radiologic studies from today's visit so you can have them easily available at your follow-up visit.   Recent Results (from the past 12 hour(s))   Vascular duplex lower extremity venous right    Collection Time: 11/23/24 12:43 PM   Result Value Ref Range    Body Surface Area 1.86 m2       Vascular duplex lower extremity venous right   Final Result        [unfilled]

## 2024-11-23 NOTE — ED NOTES
Assumed care of patient. Patient complaining of R knee pain and R leg swelling/redness that is worsening today. Patient recently had knee surgery on the R knee on Monday, incision is covered and no redness around the incision noted. Mild bruising noted around the incision. Patient reports that the R leg feels \"tight.\" Patient placed on monitor x3 with call bell within reach and side rails x2.

## 2024-12-18 ENCOUNTER — HOSPITAL ENCOUNTER (OUTPATIENT)
Age: 64
Discharge: HOME | End: 2024-12-21
Payer: COMMERCIAL

## 2024-12-18 VITALS — HEIGHT: 65 IN | WEIGHT: 169 LBS | BODY MASS INDEX: 28.16 KG/M2

## 2024-12-18 DIAGNOSIS — Z12.31 VISIT FOR SCREENING MAMMOGRAM: ICD-10-CM

## 2024-12-18 PROCEDURE — 77063 BREAST TOMOSYNTHESIS BI: CPT

## 2025-01-02 NOTE — H&P (VIEW-ONLY)
HISTORY OF PRESENT ILLNESS  Chief Complaint: Pain and Post-op of the Right Knee   Age: 64 y.o.    Sex: female     History of present illness: Ms. Quiñones presents today for 6 week f/u of right TKA. Post op course has been complicated by pain.. ambulating with cane. Says entire knee is super sensitive to even light touch of the sheets. Topping out at 100 with PT. She denies drainage, redness, swelling, inflammation near the incision.       OBJECTIVE  Constitutional:  No acute distress. Her body mass index is 28.12 kg/m².   Eyes:  Sclera are nonicteric.  Respiratory:  No labored breathing.  Cardiovascular:  No marked edema.  Skin:  No marked skin ulcers.  Neurological:  No marked sensory loss noted.  Psychiatric: Alert and oriented x3.    right Knee EXAM  INCISION- well healed, no erythema or drainage  APPEARANCE- mild swelling, no effusion  STABILITY-  No significant anterior/posterior or varus/valgus laxity noted throughout range of motion  ROM-  PROM is 2 to 100  STRENGTH/FUNCTION- Normal/expected strength with flexion and extension   PERFUSION/SENSATION- Good distal pulses, sensation and capillary refill on the lower extremity. Negative Homans sign.    IMAGING / STUDIES   Order: XR KNEE 3 VW RIGHT - Indication: Status post right knee   replacement      X-ray knee right 3 views ([id number])    Result Date: 1/2/2025  Weight Bearing. Views: Standing AP, Lat, [employer].     Impression: X-rays demonstrate good alignment of the total knee replacement without evidence of wear or loosening. No appreciable fractures.        ASSESSMENT    Impression: 6 weeks s/p right TKA with decreased ROM      PLAN  Treatment Plan:   Orders Placed This Encounter   • Surgical Posting Sheet   • X-ray knee right 3 views ([id number])   • dexamethasone (DECADRON) 4 MG tablet   • pregabalin (LYRICA) 75 MG capsule   • cyclobenzaprine (FLEXERIL) 5 MG tablet      Given her poor motion and hyper sensitive leg, I do not feel confident she will obtain

## 2025-01-03 RX ORDER — ACETAMINOPHEN 500 MG
1000 TABLET ORAL EVERY 6 HOURS PRN
COMMUNITY

## 2025-01-03 RX ORDER — CYCLOBENZAPRINE HCL 5 MG
5 TABLET ORAL 3 TIMES DAILY PRN
COMMUNITY

## 2025-01-03 RX ORDER — PREGABALIN 75 MG/1
75 CAPSULE ORAL 2 TIMES DAILY
COMMUNITY

## 2025-01-03 RX ORDER — DEXAMETHASONE 4 MG/1
4 TABLET ORAL 2 TIMES DAILY WITH MEALS
Status: ON HOLD | COMMUNITY
End: 2025-01-15

## 2025-01-03 NOTE — PROGRESS NOTES
[facility]  Preoperative Instructions        Surgery Date 1/10/2025   Time of Arrival to be called between 2pm - 5pm the day before surgery.  Contact# [phone]    On the day of your surgery, please report to Surgical Services Registration Desk and sign in at your designated time.  The Surgery Center is located to the right of the Emergency Room.     2. You must have someone with you to drive you home. You should not drive a car for 24 hours following surgery. Please make arrangements for a friend or family member to stay with you for the first 24 hours after your surgery.    3. Do not have anything to eat or drink (including water, gum, mints, coffee, juice) after midnight ??1/9/2025 .?This may not apply to medications prescribed by your physician. ?(Please note below the special instructions with medications to take the morning of your procedure.)    4. We recommend you do not drink any alcoholic beverages for 24 hours before and after your surgery.    5. Contact your surgeon's office for instructions on the following medications: non-steroidal anti-inflammatory drugs (i.e. Advil, Aleve), vitamins, and supplements. (Some surgeon's will want you to stop these medications prior to surgery and others may allow you to take them)  **If you are currently taking Plavix, Coumadin, Aspirin and/or other blood-thinning agents, contact your surgeon for instructions.** Your surgeon will partner with the physician prescribing these medications to determine if it is safe to stop or if you need to continue taking.  Please do not stop taking these medications without instructions from your surgeon    6. Wear comfortable clothes.  Wear glasses instead of contacts.  Do not bring any money or jewelry. Please bring picture ID, insurance card, and any prearranged co-payment or hospital payment.  Do not wear make-up, particularly mascara the morning of your surgery.  Do not wear nail polish, particularly if you

## 2025-01-10 NOTE — PROGRESS NOTES
Message left on voice mail of her cell phone to check with insurance to see if physical for 5/17/17 is covered if her last physical was 6/30/16. Ask to reschedule if not covered. Pre-charting complete. Care gaps identified will be addressed at the time of visit.

## 2025-01-15 ENCOUNTER — ANESTHESIA EVENT (OUTPATIENT)
Facility: HOSPITAL | Age: 65
End: 2025-01-15
Payer: COMMERCIAL

## 2025-01-15 ENCOUNTER — HOSPITAL ENCOUNTER (OUTPATIENT)
Facility: HOSPITAL | Age: 65
Setting detail: OUTPATIENT SURGERY
Discharge: HOME | End: 2025-01-15
Attending: ORTHOPAEDIC SURGERY | Admitting: ORTHOPAEDIC SURGERY
Payer: COMMERCIAL

## 2025-01-15 ENCOUNTER — ANESTHESIA (OUTPATIENT)
Facility: HOSPITAL | Age: 65
End: 2025-01-15
Payer: COMMERCIAL

## 2025-01-15 VITALS
WEIGHT: 165.57 LBS | OXYGEN SATURATION: 97 % | DIASTOLIC BLOOD PRESSURE: 71 MMHG | TEMPERATURE: 98.3 F | HEIGHT: 65 IN | BODY MASS INDEX: 27.58 KG/M2 | SYSTOLIC BLOOD PRESSURE: 119 MMHG | RESPIRATION RATE: 19 BRPM | HEART RATE: 88 BPM

## 2025-01-15 PROCEDURE — 3600000002 HC SURGERY LEVEL 2 BASE: Performed by: ORTHOPAEDIC SURGERY

## 2025-01-15 PROCEDURE — 6370000000 HC RX 637 (ALT 250 FOR IP): Performed by: ANESTHESIOLOGY

## 2025-01-15 PROCEDURE — 3700000001 HC ADD 15 MINUTES (ANESTHESIA): Performed by: ORTHOPAEDIC SURGERY

## 2025-01-15 PROCEDURE — 6360000002 HC RX W HCPCS

## 2025-01-15 PROCEDURE — 7100000011 HC PHASE II RECOVERY - ADDTL 15 MIN: Performed by: ORTHOPAEDIC SURGERY

## 2025-01-15 PROCEDURE — 6360000002 HC RX W HCPCS: Performed by: ANESTHESIOLOGY

## 2025-01-15 PROCEDURE — 3600000012 HC SURGERY LEVEL 2 ADDTL 15MIN: Performed by: ORTHOPAEDIC SURGERY

## 2025-01-15 PROCEDURE — 6370000000 HC RX 637 (ALT 250 FOR IP): Performed by: ORTHOPAEDIC SURGERY

## 2025-01-15 PROCEDURE — 2709999900 HC NON-CHARGEABLE SUPPLY: Performed by: ORTHOPAEDIC SURGERY

## 2025-01-15 PROCEDURE — 2580000003 HC RX 258: Performed by: ANESTHESIOLOGY

## 2025-01-15 PROCEDURE — 7100000010 HC PHASE II RECOVERY - FIRST 15 MIN: Performed by: ORTHOPAEDIC SURGERY

## 2025-01-15 PROCEDURE — 3700000000 HC ANESTHESIA ATTENDED CARE: Performed by: ORTHOPAEDIC SURGERY

## 2025-01-15 PROCEDURE — 7100000000 HC PACU RECOVERY - FIRST 15 MIN: Performed by: ORTHOPAEDIC SURGERY

## 2025-01-15 PROCEDURE — 7100000001 HC PACU RECOVERY - ADDTL 15 MIN: Performed by: ORTHOPAEDIC SURGERY

## 2025-01-15 RX ORDER — LIDOCAINE HYDROCHLORIDE 10 MG/ML
1 INJECTION, SOLUTION EPIDURAL; INFILTRATION; INTRACAUDAL; PERINEURAL
Status: DISCONTINUED | OUTPATIENT
Start: 2025-01-15 | End: 2025-01-15 | Stop reason: HOSPADM

## 2025-01-15 RX ORDER — PROCHLORPERAZINE EDISYLATE 5 MG/ML
5 INJECTION INTRAMUSCULAR; INTRAVENOUS
Status: COMPLETED | OUTPATIENT
Start: 2025-01-15 | End: 2025-01-15

## 2025-01-15 RX ORDER — SODIUM CHLORIDE 9 MG/ML
INJECTION, SOLUTION INTRAVENOUS PRN
Status: DISCONTINUED | OUTPATIENT
Start: 2025-01-15 | End: 2025-01-15 | Stop reason: HOSPADM

## 2025-01-15 RX ORDER — SODIUM CHLORIDE, SODIUM LACTATE, POTASSIUM CHLORIDE, CALCIUM CHLORIDE 600; 310; 30; 20 MG/100ML; MG/100ML; MG/100ML; MG/100ML
INJECTION, SOLUTION INTRAVENOUS ONCE
Status: COMPLETED | OUTPATIENT
Start: 2025-01-15 | End: 2025-01-15

## 2025-01-15 RX ORDER — HYDROMORPHONE HYDROCHLORIDE 1 MG/ML
0.5 INJECTION, SOLUTION INTRAMUSCULAR; INTRAVENOUS; SUBCUTANEOUS EVERY 5 MIN PRN
Status: COMPLETED | OUTPATIENT
Start: 2025-01-15 | End: 2025-01-15

## 2025-01-15 RX ORDER — SODIUM CHLORIDE, SODIUM LACTATE, POTASSIUM CHLORIDE, CALCIUM CHLORIDE 600; 310; 30; 20 MG/100ML; MG/100ML; MG/100ML; MG/100ML
INJECTION, SOLUTION INTRAVENOUS CONTINUOUS
Status: DISCONTINUED | OUTPATIENT
Start: 2025-01-15 | End: 2025-01-15 | Stop reason: HOSPADM

## 2025-01-15 RX ORDER — SODIUM CHLORIDE 0.9 % (FLUSH) 0.9 %
5-40 SYRINGE (ML) INJECTION EVERY 12 HOURS SCHEDULED
Status: DISCONTINUED | OUTPATIENT
Start: 2025-01-15 | End: 2025-01-15 | Stop reason: HOSPADM

## 2025-01-15 RX ORDER — SODIUM CHLORIDE 0.9 % (FLUSH) 0.9 %
5-40 SYRINGE (ML) INJECTION PRN
Status: DISCONTINUED | OUTPATIENT
Start: 2025-01-15 | End: 2025-01-15 | Stop reason: HOSPADM

## 2025-01-15 RX ORDER — KETOROLAC TROMETHAMINE 30 MG/ML
INJECTION, SOLUTION INTRAMUSCULAR; INTRAVENOUS
Status: DISCONTINUED | OUTPATIENT
Start: 2025-01-15 | End: 2025-01-15 | Stop reason: SDUPTHER

## 2025-01-15 RX ORDER — DEXAMETHASONE SODIUM PHOSPHATE 4 MG/ML
INJECTION, SOLUTION INTRA-ARTICULAR; INTRALESIONAL; INTRAMUSCULAR; INTRAVENOUS; SOFT TISSUE
Status: DISCONTINUED | OUTPATIENT
Start: 2025-01-15 | End: 2025-01-15 | Stop reason: SDUPTHER

## 2025-01-15 RX ORDER — PROPOFOL 10 MG/ML
INJECTION, EMULSION INTRAVENOUS
Status: DISCONTINUED | OUTPATIENT
Start: 2025-01-15 | End: 2025-01-15 | Stop reason: SDUPTHER

## 2025-01-15 RX ORDER — ONDANSETRON 2 MG/ML
4 INJECTION INTRAMUSCULAR; INTRAVENOUS
Status: COMPLETED | OUTPATIENT
Start: 2025-01-15 | End: 2025-01-15

## 2025-01-15 RX ORDER — ONDANSETRON 2 MG/ML
INJECTION INTRAMUSCULAR; INTRAVENOUS
Status: DISCONTINUED | OUTPATIENT
Start: 2025-01-15 | End: 2025-01-15 | Stop reason: SDUPTHER

## 2025-01-15 RX ORDER — KETOROLAC TROMETHAMINE 10 MG/1
10 TABLET, FILM COATED ORAL EVERY 6 HOURS PRN
Qty: 20 TABLET | Refills: 0 | Status: SHIPPED | OUTPATIENT
Start: 2025-01-15 | End: 2026-01-15

## 2025-01-15 RX ORDER — OXYCODONE HYDROCHLORIDE 5 MG/1
5 TABLET ORAL
Status: COMPLETED | OUTPATIENT
Start: 2025-01-15 | End: 2025-01-15

## 2025-01-15 RX ORDER — FENTANYL CITRATE 50 UG/ML
25 INJECTION, SOLUTION INTRAMUSCULAR; INTRAVENOUS EVERY 5 MIN PRN
Status: COMPLETED | OUTPATIENT
Start: 2025-01-15 | End: 2025-01-15

## 2025-01-15 RX ORDER — NALOXONE HYDROCHLORIDE 0.4 MG/ML
INJECTION, SOLUTION INTRAMUSCULAR; INTRAVENOUS; SUBCUTANEOUS PRN
Status: DISCONTINUED | OUTPATIENT
Start: 2025-01-15 | End: 2025-01-15 | Stop reason: HOSPADM

## 2025-01-15 RX ORDER — FENTANYL CITRATE 50 UG/ML
INJECTION, SOLUTION INTRAMUSCULAR; INTRAVENOUS
Status: DISCONTINUED | OUTPATIENT
Start: 2025-01-15 | End: 2025-01-15 | Stop reason: SDUPTHER

## 2025-01-15 RX ORDER — ACETAMINOPHEN 500 MG
1000 TABLET ORAL ONCE
Status: COMPLETED | OUTPATIENT
Start: 2025-01-15 | End: 2025-01-15

## 2025-01-15 RX ADMIN — SODIUM CHLORIDE, POTASSIUM CHLORIDE, SODIUM LACTATE AND CALCIUM CHLORIDE: 600; 310; 30; 20 INJECTION, SOLUTION INTRAVENOUS at 06:54

## 2025-01-15 RX ADMIN — KETOROLAC TROMETHAMINE 30 MG: 30 INJECTION, SOLUTION INTRAMUSCULAR at 07:20

## 2025-01-15 RX ADMIN — FENTANYL CITRATE 25 MCG: 50 INJECTION INTRAMUSCULAR; INTRAVENOUS at 07:50

## 2025-01-15 RX ADMIN — HYDROMORPHONE HYDROCHLORIDE 0.5 MG: 1 INJECTION, SOLUTION INTRAMUSCULAR; INTRAVENOUS; SUBCUTANEOUS at 08:00

## 2025-01-15 RX ADMIN — ONDANSETRON 4 MG: 2 INJECTION INTRAMUSCULAR; INTRAVENOUS at 07:16

## 2025-01-15 RX ADMIN — HYDROMORPHONE HYDROCHLORIDE 0.5 MG: 1 INJECTION, SOLUTION INTRAMUSCULAR; INTRAVENOUS; SUBCUTANEOUS at 07:40

## 2025-01-15 RX ADMIN — DEXAMETHASONE SODIUM PHOSPHATE 8 MG: 4 INJECTION INTRA-ARTICULAR; INTRALESIONAL; INTRAMUSCULAR; INTRAVENOUS; SOFT TISSUE at 07:16

## 2025-01-15 RX ADMIN — SODIUM CHLORIDE, POTASSIUM CHLORIDE, SODIUM LACTATE AND CALCIUM CHLORIDE: 600; 310; 30; 20 INJECTION, SOLUTION INTRAVENOUS at 07:10

## 2025-01-15 RX ADMIN — PROCHLORPERAZINE EDISYLATE 2.5 MG: 5 INJECTION INTRAMUSCULAR; INTRAVENOUS at 07:35

## 2025-01-15 RX ADMIN — FENTANYL CITRATE 25 MCG: 50 INJECTION INTRAMUSCULAR; INTRAVENOUS at 07:40

## 2025-01-15 RX ADMIN — PROPOFOL 30 MG: 10 INJECTION, EMULSION INTRAVENOUS at 07:17

## 2025-01-15 RX ADMIN — OXYCODONE 5 MG: 5 TABLET ORAL at 07:35

## 2025-01-15 RX ADMIN — Medication 3 AMPULE: at 06:55

## 2025-01-15 RX ADMIN — ACETAMINOPHEN 1000 MG: 500 TABLET ORAL at 07:45

## 2025-01-15 RX ADMIN — ONDANSETRON 4 MG: 2 INJECTION INTRAMUSCULAR; INTRAVENOUS at 07:54

## 2025-01-15 RX ADMIN — FENTANYL CITRATE 50 MCG: 50 INJECTION, SOLUTION INTRAMUSCULAR; INTRAVENOUS at 07:24

## 2025-01-15 RX ADMIN — FENTANYL CITRATE 25 MCG: 50 INJECTION INTRAMUSCULAR; INTRAVENOUS at 07:45

## 2025-01-15 RX ADMIN — FENTANYL CITRATE 25 MCG: 50 INJECTION INTRAMUSCULAR; INTRAVENOUS at 07:35

## 2025-01-15 RX ADMIN — FENTANYL CITRATE 50 MCG: 50 INJECTION, SOLUTION INTRAMUSCULAR; INTRAVENOUS at 07:16

## 2025-01-15 RX ADMIN — PROPOFOL 70 MG: 10 INJECTION, EMULSION INTRAVENOUS at 07:16

## 2025-01-15 ASSESSMENT — PAIN DESCRIPTION - PAIN TYPE: TYPE: ACUTE PAIN

## 2025-01-15 ASSESSMENT — PAIN SCALES - GENERAL
PAINLEVEL_OUTOF10: 6
PAINLEVEL_OUTOF10: 8
PAINLEVEL_OUTOF10: 7
PAINLEVEL_OUTOF10: 5
PAINLEVEL_OUTOF10: 7
PAINLEVEL_OUTOF10: 8

## 2025-01-15 ASSESSMENT — PAIN DESCRIPTION - ORIENTATION
ORIENTATION: RIGHT
ORIENTATION: LEFT
ORIENTATION: RIGHT
ORIENTATION: RIGHT

## 2025-01-15 ASSESSMENT — PAIN DESCRIPTION - LOCATION
LOCATION: KNEE

## 2025-01-15 ASSESSMENT — PAIN DESCRIPTION - DESCRIPTORS
DESCRIPTORS: ACHING
DESCRIPTORS: BURNING
DESCRIPTORS: ACHING

## 2025-01-15 NOTE — INTERVAL H&P NOTE
Update History & Physical    The patient's History and Physical of January 2, 2025 was reviewed with the patient and I examined the patient. There was no change. The surgical site was confirmed by the patient and me.     Plan: The risks, benefits, expected outcome, and alternative to the recommended procedure have been discussed with the patient. Patient understands and wants to proceed with the procedure.     Electronically signed by Osvaldo Cherry PA-C on 1/15/2025 at 7:00 AM

## 2025-01-15 NOTE — OP NOTE
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 Post procedure ROM 2 - 135.    ANESTHESIA: conscious sedation    FLUIDS: Please see anesthesia record    ESTIMATED BLOOD LOSS: n/a     TOURNIQUET TIME: n/a    INDICATIONS FOR PROCEDURE: This patient is a 64 y.o. female who underwent TKA 7 weeks ago. Post op course was complicated by arthrofibrosis and lack of progress with PT on flexion. Range of motion was 3-100. After a detailed discussion regarding the risks, benefits, and alternatives to surgery, informed consent was obtained. The patient presents now to the operating room for that operative procedure.     DETAILED DESCRIPTION OF PROCEDURE: The patient was identified in preoperative holding. The operative extremity was marked. The patient was then taken back to the operating room where the anesthetic of choice was administered. Timeouts were performed in keeping with guidelines.      The pre-procedure motion was found to be 2 to 110. The knee was then flexed and firm, steady pressure was then applied to the knee with release of scar tissue. Range of motion improved to 135 degrees. This was held for 3 minutes. The knee was then ranged and tested for stability and found to be in good balance with 135 degrees of motion.      DISPOSITION: Stable to PACU.     POSTOPERATIVE PLAN: She will resume PT to continue work on flexion.     FOLLOW UP: We will plan to see the patient back in 4 weeks in clinic for routine checkup.     Fransisco Aguilar MD

## 2025-01-15 NOTE — DISCHARGE INSTRUCTIONS
--ice knee as needed  --aggressive PT  DISCHARGE SUMMARY from Nurse    PATIENT INSTRUCTIONS:    After general anesthesia or intravenous sedation, for 24 hours or while taking prescription narcotics:    Have someone responsible help you with your care  Limit your activities  Do not drive and operate hazardous machinery  Do not make important personal, legal or business decisions  Do not drink alcoholic beverages  If you have not urinated within 8 hours after discharge, please contact your surgeon on call  Resume your medications unless otherwise instructed    From general anesthesia, intravenous sedation, or while taking prescription narcotics, you may experience:    Drowsiness, dizziness, sleepiness, or confusion  Difficulty remembering or delayed reaction times  Difficulty with your balance, especially while walking, move slowly and carefully, do not make sudden position changes  Difficulty focusing or blurred vision    You may not be aware of slight changes in your behavior and/or your reaction time because of the medication used during and after your procedure.    Report the following to your surgeon:  Excessive pain, swelling, redness or odor of or around the surgical area  Temperature over 100.5  Nausea and vomiting lasting longer than 4 hours or if unable to take medications  Any signs of decreased circulation or nerve impairment to extremity: change in color, persistent numbness, tingling, coldness or increase pain  Any questions or concerns         IF YOU REPORT TO AN EMERGENCY ROOM, DOCTOR'S OFFICE OR HOSPITAL WITHIN 24 HOURS AFTER YOUR PROCEDURE, BRING THIS SHEET AND YOUR AFTER VISIT SUMMARY WITH YOU AND GIVE IT TO THE PHYSICIAN OR NURSE ATTENDING YOU.    These are general instructions for a healthy lifestyle (if applicable):    No smoking/ No tobacco products/ Avoid exposure to secondhand smoke  Surgeon General's Warning:  Quitting smoking now greatly reduces serious risk to your health.    Obesity,

## 2025-01-15 NOTE — ANESTHESIA POSTPROCEDURE EVALUATION
Department of Anesthesiology  Postprocedure Note    Patient: Ebony Quiñones  MRN: [id number]  YOB: 1960  Date of evaluation: 1/15/2025    Procedure Summary       Date: 01/15/25 Room / Location: [facility] MAIN OR  / [facility] MAIN OR    Anesthesia Start: 0710 Anesthesia Stop: 0731    Procedure: MANIPULATION RIGHT UNDER ANESTHESIA (Right: Knee) Diagnosis:       Status post right partial knee replacement      Fibrosis of knee joint, right      (Status post right partial knee replacement [Z96.651])      (Fibrosis of knee joint, right [M24.661])    Providers: Fransisco Aguilar MD Responsible Provider: Duglas Salcedo MD    Anesthesia Type: MAC ASA Status: 2            Anesthesia Type: MAC    Parisa Phase I: Parisa Score: 10    Parisa Phase II:      Anesthesia Post Evaluation    Patient location during evaluation: PACU  Patient participation: complete - patient participated  Level of consciousness: awake  Pain score: 0  Airway patency: patent  Nausea & Vomiting: no nausea and no vomiting  Cardiovascular status: hemodynamically stable  Respiratory status: acceptable  Hydration status: stable  Multimodal analgesia pain management approach  Pain management: adequate    No notable events documented.

## 2025-01-15 NOTE — ANESTHESIA PRE PROCEDURE
Department of Anesthesiology  Preprocedure Note       Name:  Ebony Quiñones   Age:  64 y.o.  :  1960                                          MRN:  [id number]         Date:  1/15/2025      Surgeon: Surgeon(s):  Fransisco Aguilar MD    Procedure: Procedure(s):  MANIPULATION RIGHT UNDER ANESTHESIA    Medications prior to admission:   Prior to Admission medications    Medication Sig Start Date End Date Taking? Authorizing Provider   pregabalin (LYRICA) 75 MG capsule Take 1 capsule by mouth 2 times daily.   Yes Shefali Angela MD   cyclobenzaprine (FLEXERIL) 5 MG tablet Take 1 tablet by mouth 3 times daily as needed for Muscle spasms   Yes Shefali Angela MD   acetaminophen (TYLENOL) 500 MG tablet Take 2 tablets by mouth every 6 hours as needed for Pain   Yes Shefali Angela MD   rosuvastatin (CRESTOR) 10 MG tablet Take 1 tablet by mouth   Yes Shefali Angela MD   traZODone (DESYREL) 100 MG tablet TAKE 1 TO 1 1/2 TABLETS BY MOUTH NIGHTLY AT BEDTIME. OFFICE VISIT DUE BEFORE NEXT REFILL 23  Yes Automatic Reconciliation, Ar   cyanocobalamin 1000 MCG tablet Take by mouth daily  Patient not taking: Reported on 1/3/2025    Automatic Reconciliation, Ar   famciclovir (FAMVIR) 500 MG tablet TAKE 3 TABLETS BY MOUTH ONCE WHEN SYMPTOMS OCCUE 21   Automatic Reconciliation, Ar   ibuprofen (ADVIL;MOTRIN) 800 MG tablet Take by mouth every 6 hours as needed 23   Automatic Reconciliation, Ar       Current medications:    Current Facility-Administered Medications   Medication Dose Route Frequency Provider Last Rate Last Admin   • vancomycin (VANCOCIN) 1,000 mg in sodium chloride 0.9 % 250 mL IVPB ([id number])  1,000 mg IntraVENous Once Fransisco Aguilar MD       • lidocaine PF 1 % injection 1 mL  1 mL IntraDERmal Once PRN Duglas Salcedo MD       • acetaminophen (TYLENOL) tablet 1,000 mg  1,000 mg Oral Once Duglas Salcedo MD       • lactated ringers infusion   IntraVENous Continuous

## 2025-07-16 ENCOUNTER — TRANSCRIBE ORDERS (OUTPATIENT)
Facility: HOSPITAL | Age: 65
End: 2025-07-16

## 2025-07-16 DIAGNOSIS — N64.4 MASTODYNIA: Primary | ICD-10-CM

## 2025-08-12 ENCOUNTER — HOSPITAL ENCOUNTER (OUTPATIENT)
Facility: HOSPITAL | Age: 65
Discharge: HOME | End: 2025-08-15
Attending: OBSTETRICS & GYNECOLOGY
Payer: COMMERCIAL

## 2025-08-12 VITALS — WEIGHT: 165 LBS | BODY MASS INDEX: 27.46 KG/M2

## 2025-08-12 DIAGNOSIS — N64.4 BREAST PAIN, LEFT: ICD-10-CM

## 2025-08-12 DIAGNOSIS — N64.4 MASTODYNIA: ICD-10-CM

## 2025-08-12 PROCEDURE — G0279 TOMOSYNTHESIS, MAMMO: HCPCS

## (undated) DEVICE — SYRINGE MED 10ML LUERLOCK TIP W/O SFTY DISP

## (undated) DEVICE — SPONGE GZ W4XL4IN COT 12 PLY TYP VII WVN C FLD DSGN STERILE